# Patient Record
Sex: FEMALE | Race: OTHER | HISPANIC OR LATINO | URBAN - METROPOLITAN AREA
[De-identification: names, ages, dates, MRNs, and addresses within clinical notes are randomized per-mention and may not be internally consistent; named-entity substitution may affect disease eponyms.]

---

## 2020-11-17 ENCOUNTER — EMERGENCY (EMERGENCY)
Facility: HOSPITAL | Age: 75
LOS: 0 days | Discharge: HOME | End: 2020-11-17
Attending: EMERGENCY MEDICINE
Payer: MEDICARE

## 2020-11-17 VITALS
TEMPERATURE: 98 F | HEART RATE: 74 BPM | SYSTOLIC BLOOD PRESSURE: 125 MMHG | HEIGHT: 64 IN | WEIGHT: 139.99 LBS | RESPIRATION RATE: 16 BRPM | OXYGEN SATURATION: 99 % | DIASTOLIC BLOOD PRESSURE: 57 MMHG

## 2020-11-17 VITALS
TEMPERATURE: 98 F | DIASTOLIC BLOOD PRESSURE: 66 MMHG | SYSTOLIC BLOOD PRESSURE: 147 MMHG | RESPIRATION RATE: 17 BRPM | OXYGEN SATURATION: 99 % | HEART RATE: 66 BPM

## 2020-11-17 DIAGNOSIS — I25.10 ATHEROSCLEROTIC HEART DISEASE OF NATIVE CORONARY ARTERY WITHOUT ANGINA PECTORIS: ICD-10-CM

## 2020-11-17 DIAGNOSIS — R19.7 DIARRHEA, UNSPECIFIED: ICD-10-CM

## 2020-11-17 DIAGNOSIS — R07.9 CHEST PAIN, UNSPECIFIED: ICD-10-CM

## 2020-11-17 DIAGNOSIS — K57.92 DIVERTICULITIS OF INTESTINE, PART UNSPECIFIED, WITHOUT PERFORATION OR ABSCESS WITHOUT BLEEDING: ICD-10-CM

## 2020-11-17 DIAGNOSIS — E11.51 TYPE 2 DIABETES MELLITUS WITH DIABETIC PERIPHERAL ANGIOPATHY WITHOUT GANGRENE: ICD-10-CM

## 2020-11-17 DIAGNOSIS — R10.13 EPIGASTRIC PAIN: ICD-10-CM

## 2020-11-17 DIAGNOSIS — F32.9 MAJOR DEPRESSIVE DISORDER, SINGLE EPISODE, UNSPECIFIED: ICD-10-CM

## 2020-11-17 DIAGNOSIS — I10 ESSENTIAL (PRIMARY) HYPERTENSION: ICD-10-CM

## 2020-11-17 LAB
ALBUMIN SERPL ELPH-MCNC: 3.6 G/DL — SIGNIFICANT CHANGE UP (ref 3.3–5)
ALP SERPL-CCNC: 90 U/L — SIGNIFICANT CHANGE UP (ref 40–120)
ALT FLD-CCNC: 33 U/L — SIGNIFICANT CHANGE UP (ref 12–78)
ANION GAP SERPL CALC-SCNC: 8 MMOL/L — SIGNIFICANT CHANGE UP (ref 5–17)
APPEARANCE UR: CLEAR — SIGNIFICANT CHANGE UP
APTT BLD: 32.2 SEC — SIGNIFICANT CHANGE UP (ref 27.5–35.5)
AST SERPL-CCNC: 18 U/L — SIGNIFICANT CHANGE UP (ref 15–37)
BACTERIA # UR AUTO: ABNORMAL
BASOPHILS # BLD AUTO: 0.01 K/UL — SIGNIFICANT CHANGE UP (ref 0–0.2)
BASOPHILS NFR BLD AUTO: 0.2 % — SIGNIFICANT CHANGE UP (ref 0–2)
BILIRUB SERPL-MCNC: 0.5 MG/DL — SIGNIFICANT CHANGE UP (ref 0.2–1.2)
BILIRUB UR-MCNC: NEGATIVE — SIGNIFICANT CHANGE UP
BUN SERPL-MCNC: 6 MG/DL — LOW (ref 7–23)
CALCIUM SERPL-MCNC: 9.1 MG/DL — SIGNIFICANT CHANGE UP (ref 8.5–10.1)
CHLORIDE SERPL-SCNC: 102 MMOL/L — SIGNIFICANT CHANGE UP (ref 96–108)
CK MB CFR SERPL CALC: <1 NG/ML — SIGNIFICANT CHANGE UP (ref 0.5–3.6)
CO2 SERPL-SCNC: 24 MMOL/L — SIGNIFICANT CHANGE UP (ref 22–31)
COLOR SPEC: YELLOW — SIGNIFICANT CHANGE UP
CREAT SERPL-MCNC: 0.72 MG/DL — SIGNIFICANT CHANGE UP (ref 0.5–1.3)
DIFF PNL FLD: ABNORMAL
EOSINOPHIL # BLD AUTO: 0.01 K/UL — SIGNIFICANT CHANGE UP (ref 0–0.5)
EOSINOPHIL NFR BLD AUTO: 0.2 % — SIGNIFICANT CHANGE UP (ref 0–6)
GLUCOSE SERPL-MCNC: 105 MG/DL — HIGH (ref 70–99)
GLUCOSE UR QL: NEGATIVE MG/DL — SIGNIFICANT CHANGE UP
HCT VFR BLD CALC: 31.6 % — LOW (ref 34.5–45)
HGB BLD-MCNC: 10.9 G/DL — LOW (ref 11.5–15.5)
IMM GRANULOCYTES NFR BLD AUTO: 0.2 % — SIGNIFICANT CHANGE UP (ref 0–1.5)
INR BLD: 1.25 RATIO — HIGH (ref 0.88–1.16)
KETONES UR-MCNC: ABNORMAL
LEUKOCYTE ESTERASE UR-ACNC: ABNORMAL
LIDOCAIN IGE QN: 107 U/L — SIGNIFICANT CHANGE UP (ref 73–393)
LYMPHOCYTES # BLD AUTO: 1.53 K/UL — SIGNIFICANT CHANGE UP (ref 1–3.3)
LYMPHOCYTES # BLD AUTO: 25.1 % — SIGNIFICANT CHANGE UP (ref 13–44)
MAGNESIUM SERPL-MCNC: 1.4 MG/DL — LOW (ref 1.6–2.6)
MCHC RBC-ENTMCNC: 34.5 GM/DL — SIGNIFICANT CHANGE UP (ref 32–36)
MCHC RBC-ENTMCNC: 36 PG — HIGH (ref 27–34)
MCV RBC AUTO: 104.3 FL — HIGH (ref 80–100)
MONOCYTES # BLD AUTO: 0.38 K/UL — SIGNIFICANT CHANGE UP (ref 0–0.9)
MONOCYTES NFR BLD AUTO: 6.2 % — SIGNIFICANT CHANGE UP (ref 2–14)
NEUTROPHILS # BLD AUTO: 4.15 K/UL — SIGNIFICANT CHANGE UP (ref 1.8–7.4)
NEUTROPHILS NFR BLD AUTO: 68.1 % — SIGNIFICANT CHANGE UP (ref 43–77)
NITRITE UR-MCNC: NEGATIVE — SIGNIFICANT CHANGE UP
NRBC # BLD: 0 /100 WBCS — SIGNIFICANT CHANGE UP (ref 0–0)
NT-PROBNP SERPL-SCNC: 1555 PG/ML — HIGH (ref 0–125)
PH UR: 6 — SIGNIFICANT CHANGE UP (ref 5–8)
PLATELET # BLD AUTO: 306 K/UL — SIGNIFICANT CHANGE UP (ref 150–400)
POTASSIUM SERPL-MCNC: 3.5 MMOL/L — SIGNIFICANT CHANGE UP (ref 3.5–5.3)
POTASSIUM SERPL-SCNC: 3.5 MMOL/L — SIGNIFICANT CHANGE UP (ref 3.5–5.3)
PROT SERPL-MCNC: 7.3 GM/DL — SIGNIFICANT CHANGE UP (ref 6–8.3)
PROT UR-MCNC: 15 MG/DL
PROTHROM AB SERPL-ACNC: 14.4 SEC — HIGH (ref 10.6–13.6)
RBC # BLD: 3.03 M/UL — LOW (ref 3.8–5.2)
RBC # FLD: 12.8 % — SIGNIFICANT CHANGE UP (ref 10.3–14.5)
RBC CASTS # UR COMP ASSIST: SIGNIFICANT CHANGE UP /HPF (ref 0–4)
SODIUM SERPL-SCNC: 134 MMOL/L — LOW (ref 135–145)
SP GR SPEC: 1.01 — SIGNIFICANT CHANGE UP (ref 1.01–1.02)
TROPONIN I SERPL-MCNC: <.015 NG/ML — SIGNIFICANT CHANGE UP (ref 0.01–0.04)
UROBILINOGEN FLD QL: NEGATIVE MG/DL — SIGNIFICANT CHANGE UP
WBC # BLD: 6.09 K/UL — SIGNIFICANT CHANGE UP (ref 3.8–10.5)
WBC # FLD AUTO: 6.09 K/UL — SIGNIFICANT CHANGE UP (ref 3.8–10.5)
WBC UR QL: SIGNIFICANT CHANGE UP

## 2020-11-17 PROCEDURE — 99285 EMERGENCY DEPT VISIT HI MDM: CPT

## 2020-11-17 PROCEDURE — 74177 CT ABD & PELVIS W/CONTRAST: CPT | Mod: 26

## 2020-11-17 PROCEDURE — 93010 ELECTROCARDIOGRAM REPORT: CPT

## 2020-11-17 PROCEDURE — 71045 X-RAY EXAM CHEST 1 VIEW: CPT | Mod: 26

## 2020-11-17 RX ORDER — CIPROFLOXACIN LACTATE 400MG/40ML
500 VIAL (ML) INTRAVENOUS ONCE
Refills: 0 | Status: COMPLETED | OUTPATIENT
Start: 2020-11-17 | End: 2020-11-17

## 2020-11-17 RX ORDER — ACETAMINOPHEN 500 MG
975 TABLET ORAL ONCE
Refills: 0 | Status: COMPLETED | OUTPATIENT
Start: 2020-11-17 | End: 2020-11-17

## 2020-11-17 RX ORDER — CIPROFLOXACIN LACTATE 400MG/40ML
1 VIAL (ML) INTRAVENOUS
Qty: 18 | Refills: 0
Start: 2020-11-17 | End: 2020-11-25

## 2020-11-17 RX ORDER — MAGNESIUM OXIDE 400 MG ORAL TABLET 241.3 MG
400 TABLET ORAL ONCE
Refills: 0 | Status: COMPLETED | OUTPATIENT
Start: 2020-11-17 | End: 2020-11-17

## 2020-11-17 RX ORDER — PANTOPRAZOLE SODIUM 20 MG/1
40 TABLET, DELAYED RELEASE ORAL ONCE
Refills: 0 | Status: COMPLETED | OUTPATIENT
Start: 2020-11-17 | End: 2020-11-17

## 2020-11-17 RX ORDER — METRONIDAZOLE 500 MG
1 TABLET ORAL
Qty: 18 | Refills: 0
Start: 2020-11-17 | End: 2020-11-25

## 2020-11-17 RX ORDER — METRONIDAZOLE 500 MG
500 TABLET ORAL ONCE
Refills: 0 | Status: COMPLETED | OUTPATIENT
Start: 2020-11-17 | End: 2020-11-17

## 2020-11-17 RX ORDER — SODIUM CHLORIDE 9 MG/ML
1000 INJECTION, SOLUTION INTRAVENOUS ONCE
Refills: 0 | Status: COMPLETED | OUTPATIENT
Start: 2020-11-17 | End: 2020-11-17

## 2020-11-17 RX ORDER — SODIUM CHLORIDE 9 MG/ML
1000 INJECTION INTRAMUSCULAR; INTRAVENOUS; SUBCUTANEOUS ONCE
Refills: 0 | Status: COMPLETED | OUTPATIENT
Start: 2020-11-17 | End: 2020-11-17

## 2020-11-17 RX ADMIN — PANTOPRAZOLE SODIUM 40 MILLIGRAM(S): 20 TABLET, DELAYED RELEASE ORAL at 13:11

## 2020-11-17 RX ADMIN — MAGNESIUM OXIDE 400 MG ORAL TABLET 400 MILLIGRAM(S): 241.3 TABLET ORAL at 15:39

## 2020-11-17 RX ADMIN — SODIUM CHLORIDE 1000 MILLILITER(S): 9 INJECTION INTRAMUSCULAR; INTRAVENOUS; SUBCUTANEOUS at 13:12

## 2020-11-17 RX ADMIN — Medication 500 MILLIGRAM(S): at 15:40

## 2020-11-17 RX ADMIN — SODIUM CHLORIDE 1000 MILLILITER(S): 9 INJECTION, SOLUTION INTRAVENOUS at 13:11

## 2020-11-17 RX ADMIN — Medication 975 MILLIGRAM(S): at 15:40

## 2020-11-17 NOTE — ED PROVIDER NOTE - CARE PROVIDERS DIRECT ADDRESSES
[email or web address]

## 2020-11-17 NOTE — ED PROVIDER NOTE - PATIENT PORTAL LINK FT
You can access the FollowMyHealth Patient Portal offered by [facility] by registering at the following website: [email or web address]. By joining [employer]’s FollowMyHealth portal, you will also be able to view your health information using other applications (apps) compatible with our system.

## 2020-11-17 NOTE — ED PROVIDER NOTE - CLINICAL SUMMARY MEDICAL DECISION MAKING FREE TEXT BOX
epigastric pain. I read ekg as nsr rate 72, no st elevation or depression, qtc 427, narrow qrs, normal axis, inferior qs. epigastric pain. I read ekg as nsr rate 72, no st elevation or depression, qtc 427, narrow qrs, normal axis, inferior qs.  ct shows diverticulitis. will start abx. ok for dc home.

## 2020-11-17 NOTE — ED ADULT TRIAGE NOTE - CHIEF COMPLAINT QUOTE
chest pains chest pains since Saturday,  with abdominal pain, diarrhea and poor po intake since yesterday.

## 2020-11-17 NOTE — ED ADULT NURSE NOTE - CHPI ED NUR SYMPTOMS NEG
no chills/no back pain/no fever/no congestion/no syncope/no dizziness/no diaphoresis/no shortness of breath/no vomiting

## 2020-11-17 NOTE — ED ADULT NURSE NOTE - OBJECTIVE STATEMENT
76 y/o F AOx4 presents with c/o dull chest pain/epigastric pain rated at 7/10 consistent since Saturday. Pt lives with son Rainer who is at bedside. Pt states she fell from last bottom step on Friday and pain has been there since then. Pt has decreased intake, nausea and diarrhea since Friday as well. PMH of diabetes controlled with Metformin no sx hx.

## 2020-11-17 NOTE — ED PROVIDER NOTE - OBJECTIVE STATEMENT
75F hx of cad, pad, htn, dm, depression, pw cp. patient points more to her epigastrium. she notes this since friday 4 days ago. also notes loose stools (4 today) and weakness and poor appetite. no fever, chills, vomiting. feels nausea. ros neg for ha, vision loss, rhinorrhea, rash, bleeding, numbness, dysuria, back pain.

## 2020-12-13 ENCOUNTER — RESULT CHARGE (OUTPATIENT)
Age: 75
End: 2020-12-13

## 2021-01-14 ENCOUNTER — LABORATORY RESULT (OUTPATIENT)
Age: 76
End: 2021-01-14

## 2021-01-14 ENCOUNTER — NON-APPOINTMENT (OUTPATIENT)
Age: 76
End: 2021-01-14

## 2021-01-14 ENCOUNTER — APPOINTMENT (OUTPATIENT)
Dept: CARDIOLOGY | Facility: CLINIC | Age: 76
End: 2021-01-14
Payer: MEDICARE

## 2021-01-14 VITALS
TEMPERATURE: 98.1 F | DIASTOLIC BLOOD PRESSURE: 62 MMHG | SYSTOLIC BLOOD PRESSURE: 100 MMHG | BODY MASS INDEX: 23.22 KG/M2 | HEIGHT: 64 IN | HEART RATE: 87 BPM | WEIGHT: 136 LBS | OXYGEN SATURATION: 98 %

## 2021-01-14 PROCEDURE — 99203 OFFICE O/P NEW LOW 30 MIN: CPT

## 2021-01-14 PROCEDURE — 99072 ADDL SUPL MATRL&STAF TM PHE: CPT

## 2021-01-14 PROCEDURE — 93000 ELECTROCARDIOGRAM COMPLETE: CPT

## 2021-01-20 ENCOUNTER — APPOINTMENT (OUTPATIENT)
Dept: CARDIOLOGY | Facility: CLINIC | Age: 76
End: 2021-01-20
Payer: MEDICARE

## 2021-01-20 PROCEDURE — 99072 ADDL SUPL MATRL&STAF TM PHE: CPT

## 2021-01-20 PROCEDURE — 93306 TTE W/DOPPLER COMPLETE: CPT

## 2021-01-21 LAB
ALBUMIN SERPL ELPH-MCNC: 4.3 G/DL
ALP BLD-CCNC: 93 U/L
ALT SERPL-CCNC: 21 U/L
ANION GAP SERPL CALC-SCNC: 15 MMOL/L
AST SERPL-CCNC: 18 U/L
BASOPHILS # BLD AUTO: 0.03 K/UL
BASOPHILS NFR BLD AUTO: 0.3 %
BILIRUB SERPL-MCNC: 0.2 MG/DL
BUN SERPL-MCNC: 14 MG/DL
CALCIUM SERPL-MCNC: 9.7 MG/DL
CHLORIDE SERPL-SCNC: 101 MMOL/L
CHOLEST SERPL-MCNC: 287 MG/DL
CO2 SERPL-SCNC: 20 MMOL/L
CREAT SERPL-MCNC: 0.81 MG/DL
CRP SERPL HS-MCNC: 1.82 MG/L
EOSINOPHIL # BLD AUTO: 0.06 K/UL
EOSINOPHIL NFR BLD AUTO: 0.7 %
ESTIMATED AVERAGE GLUCOSE: 137 MG/DL
GLUCOSE SERPL-MCNC: 192 MG/DL
HBA1C MFR BLD HPLC: 6.4 %
HCT VFR BLD CALC: 34.1 %
HDLC SERPL-MCNC: 38 MG/DL
HGB BLD-MCNC: 11.3 G/DL
IMM GRANULOCYTES NFR BLD AUTO: 0.2 %
LDLC SERPL CALC-MCNC: NORMAL MG/DL
LYMPHOCYTES # BLD AUTO: 2.63 K/UL
LYMPHOCYTES NFR BLD AUTO: 28.6 %
MAGNESIUM SERPL-MCNC: 2 MG/DL
MAN DIFF?: NORMAL
MCHC RBC-ENTMCNC: 33.1 GM/DL
MCHC RBC-ENTMCNC: 35.5 PG
MCV RBC AUTO: 107.2 FL
MONOCYTES # BLD AUTO: 0.57 K/UL
MONOCYTES NFR BLD AUTO: 6.2 %
NEUTROPHILS # BLD AUTO: 5.89 K/UL
NEUTROPHILS NFR BLD AUTO: 64 %
NONHDLC SERPL-MCNC: 249 MG/DL
PLATELET # BLD AUTO: 354 K/UL
POTASSIUM SERPL-SCNC: 4.6 MMOL/L
PROT SERPL-MCNC: 6.8 G/DL
RBC # BLD: 3.18 M/UL
RBC # FLD: 12.5 %
SODIUM SERPL-SCNC: 136 MMOL/L
TRIGL SERPL-MCNC: 513 MG/DL
TSH SERPL-ACNC: 2.98 UIU/ML
WBC # FLD AUTO: 9.2 K/UL

## 2021-01-26 NOTE — PHYSICAL EXAM
[General Appearance - Well Developed] : well developed [Normal Appearance] : normal appearance [Well Groomed] : well groomed [General Appearance - Well Nourished] : well nourished [No Deformities] : no deformities [General Appearance - In No Acute Distress] : no acute distress [Normal Conjunctiva] : the conjunctiva exhibited no abnormalities [Eyelids - No Xanthelasma] : the eyelids demonstrated no xanthelasmas [Normal Oral Mucosa] : normal oral mucosa [No Oral Pallor] : no oral pallor [No Oral Cyanosis] : no oral cyanosis [Normal Jugular Venous A Waves Present] : normal jugular venous A waves present [Normal Jugular Venous V Waves Present] : normal jugular venous V waves present [No Jugular Venous Sheppard A Waves] : no jugular venous sheppard A waves [Heart Rate And Rhythm] : heart rate and rhythm were normal [Heart Sounds] : normal S1 and S2 [Murmurs] : no murmurs present [Respiration, Rhythm And Depth] : normal respiratory rhythm and effort [Exaggerated Use Of Accessory Muscles For Inspiration] : no accessory muscle use [Auscultation Breath Sounds / Voice Sounds] : lungs were clear to auscultation bilaterally [Abdomen Soft] : soft [Abdomen Tenderness] : non-tender [Abdomen Mass (___ Cm)] : no abdominal mass palpated [Abnormal Walk] : normal gait [Gait - Sufficient For Exercise Testing] : the gait was sufficient for exercise testing [Nail Clubbing] : no clubbing of the fingernails [Cyanosis, Localized] : no localized cyanosis [Petechial Hemorrhages (___cm)] : no petechial hemorrhages [Skin Color & Pigmentation] : normal skin color and pigmentation [] : no rash [No Venous Stasis] : no venous stasis [Skin Lesions] : no skin lesions [No Skin Ulcers] : no skin ulcer [No Xanthoma] : no  xanthoma was observed [Oriented To Time, Place, And Person] : oriented to person, place, and time [Affect] : the affect was normal [Mood] : the mood was normal [No Anxiety] : not feeling anxious

## 2021-02-05 NOTE — DISCUSSION/SUMMARY
[FreeTextEntry1] : Asymptomatic, BP's adequate. \par Repeat echo requested for risk assessment.\par Will research into copy of PCI report, plan length of treatment with Brilinta.\par \par Labs requested.\par Will see in several weeks for review of findings.

## 2021-02-25 ENCOUNTER — NON-APPOINTMENT (OUTPATIENT)
Age: 76
End: 2021-02-25

## 2021-02-25 ENCOUNTER — APPOINTMENT (OUTPATIENT)
Dept: CARDIOLOGY | Facility: CLINIC | Age: 76
End: 2021-02-25
Payer: MEDICARE

## 2021-02-25 VITALS
OXYGEN SATURATION: 97 % | DIASTOLIC BLOOD PRESSURE: 60 MMHG | WEIGHT: 140 LBS | SYSTOLIC BLOOD PRESSURE: 110 MMHG | BODY MASS INDEX: 23.9 KG/M2 | HEART RATE: 82 BPM | TEMPERATURE: 97.8 F | HEIGHT: 64 IN

## 2021-02-25 PROCEDURE — 99072 ADDL SUPL MATRL&STAF TM PHE: CPT

## 2021-02-25 PROCEDURE — 99214 OFFICE O/P EST MOD 30 MIN: CPT

## 2021-02-25 PROCEDURE — 93000 ELECTROCARDIOGRAM COMPLETE: CPT

## 2021-02-25 RX ORDER — OMEGA-3/DHA/EPA/FISH OIL 300-1000MG
1000 CAPSULE,DELAYED RELEASE (ENTERIC COATED) ORAL
Qty: 90 | Refills: 3 | Status: ACTIVE | COMMUNITY
Start: 2021-02-25 | End: 1900-01-01

## 2021-02-25 RX ORDER — EZETIMIBE 10 MG/1
10 TABLET ORAL
Qty: 90 | Refills: 3 | Status: ACTIVE | COMMUNITY
Start: 2021-02-25 | End: 1900-01-01

## 2021-02-25 NOTE — PHYSICAL EXAM
[General Appearance - Well Developed] : well developed [Normal Appearance] : normal appearance [Well Groomed] : well groomed [General Appearance - Well Nourished] : well nourished [No Deformities] : no deformities [General Appearance - In No Acute Distress] : no acute distress [Normal Conjunctiva] : the conjunctiva exhibited no abnormalities [Eyelids - No Xanthelasma] : the eyelids demonstrated no xanthelasmas [Normal Oral Mucosa] : normal oral mucosa [No Oral Pallor] : no oral pallor [No Oral Cyanosis] : no oral cyanosis [Normal Jugular Venous A Waves Present] : normal jugular venous A waves present [Normal Jugular Venous V Waves Present] : normal jugular venous V waves present [No Jugular Venous Sheppard A Waves] : no jugular venous sheppard A waves [Respiration, Rhythm And Depth] : normal respiratory rhythm and effort [Exaggerated Use Of Accessory Muscles For Inspiration] : no accessory muscle use [Auscultation Breath Sounds / Voice Sounds] : lungs were clear to auscultation bilaterally [Heart Rate And Rhythm] : heart rate and rhythm were normal [Heart Sounds] : normal S1 and S2 [Murmurs] : no murmurs present [Abdomen Soft] : soft [Abdomen Tenderness] : non-tender [Abdomen Mass (___ Cm)] : no abdominal mass palpated [Abnormal Walk] : normal gait [Gait - Sufficient For Exercise Testing] : the gait was sufficient for exercise testing [Nail Clubbing] : no clubbing of the fingernails [Cyanosis, Localized] : no localized cyanosis [Petechial Hemorrhages (___cm)] : no petechial hemorrhages [Skin Color & Pigmentation] : normal skin color and pigmentation [] : no rash [No Venous Stasis] : no venous stasis [Skin Lesions] : no skin lesions [No Skin Ulcers] : no skin ulcer [No Xanthoma] : no  xanthoma was observed [Oriented To Time, Place, And Person] : oriented to person, place, and time [Affect] : the affect was normal [Mood] : the mood was normal [No Anxiety] : not feeling anxious

## 2021-02-25 NOTE — HISTORY OF PRESENT ILLNESS
[FreeTextEntry1] : 75-year-old female with h/o DM, hypertension, and hyperlipidemia. h/o CAD, s/p PCI in [place], records unavailable.\par \par Seen in [facility] ED on 11/19/20 for atypical chest pain,and abdominal discomfort and change in bowel movements, diagnosed with diverticulitis and subsequently discharged on antibiotics. \par \par Here for followup of ASHD. c/o pain in antecubital veins bilaterally.

## 2021-02-25 NOTE — DISCUSSION/SUMMARY
[Patient] : the patient [___ Month(s)] : [unfilled] month(s) [FreeTextEntry1] : Asymptomatic, BP's adequate. \par Will continue to research into copy of PCI report, plan length of treatment with Brilinta.\par Requested venous UE ultrasound to r/o venous thromboses.\par Labs reviewed with patient. Persistently elevated cholesterol and TG's (non-fasting), added Zetia and recommended OTC omega 3 supplements. Recheck labs in 2-3 months.\par \par

## 2021-03-04 ENCOUNTER — APPOINTMENT (OUTPATIENT)
Dept: INTERNAL MEDICINE | Facility: CLINIC | Age: 76
End: 2021-03-04
Payer: MEDICARE

## 2021-03-04 ENCOUNTER — NON-APPOINTMENT (OUTPATIENT)
Age: 76
End: 2021-03-04

## 2021-03-04 VITALS
OXYGEN SATURATION: 95 % | RESPIRATION RATE: 16 BRPM | HEIGHT: 64 IN | WEIGHT: 140 LBS | DIASTOLIC BLOOD PRESSURE: 70 MMHG | SYSTOLIC BLOOD PRESSURE: 110 MMHG | BODY MASS INDEX: 23.9 KG/M2 | HEART RATE: 75 BPM

## 2021-03-04 DIAGNOSIS — Z83.3 FAMILY HISTORY OF DIABETES MELLITUS: ICD-10-CM

## 2021-03-04 DIAGNOSIS — Z12.39 ENCOUNTER FOR OTHER SCREENING FOR MALIGNANT NEOPLASM OF BREAST: ICD-10-CM

## 2021-03-04 PROCEDURE — 36415 COLL VENOUS BLD VENIPUNCTURE: CPT

## 2021-03-04 PROCEDURE — 99072 ADDL SUPL MATRL&STAF TM PHE: CPT

## 2021-03-04 PROCEDURE — 99213 OFFICE O/P EST LOW 20 MIN: CPT | Mod: 25

## 2021-03-04 PROCEDURE — G0439: CPT

## 2021-03-04 NOTE — HISTORY OF PRESENT ILLNESS
[FreeTextEntry1] : annual PE\par mult med issues. [de-identified] : 74 y/o female with a hx of dm, hld, htn, cad s/p rca thrombectomy and LANCE 4/20, diverticular dz, Vewrtigo, tremor, forgetfulness\par here for annual PE and her medical issues.\par with no c/o besides the tremors, vertigo, forgetfulness and tinnitus.\par No CV c/o.\par No GI c/o.\par No other neuro sx.\par No musculoskeletal c/o.\par living with family.\par No tob.\par Taking meds.\par Walks regularly, diet healthy\par Recently followed up with Dr Grove - labs reviewed.\par \par Vaccines - reported as up to date - to request records.  \par colon - ? 2 years ago.  did not f/u after the ER visit.\par mammo - due

## 2021-03-04 NOTE — PHYSICAL EXAM
[No Acute Distress] : no acute distress [Well Nourished] : well nourished [Well Developed] : well developed [Well-Appearing] : well-appearing [Normal Sclera/Conjunctiva] : normal sclera/conjunctiva [PERRL] : pupils equal round and reactive to light [EOMI] : extraocular movements intact [Normal Outer Ear/Nose] : the outer ears and nose were normal in appearance [Normal Oropharynx] : the oropharynx was normal [Normal TMs] : both tympanic membranes were normal [No JVD] : no jugular venous distention [No Lymphadenopathy] : no lymphadenopathy [Supple] : supple [Thyroid Normal, No Nodules] : the thyroid was normal and there were no nodules present [No Respiratory Distress] : no respiratory distress  [No Accessory Muscle Use] : no accessory muscle use [Clear to Auscultation] : lungs were clear to auscultation bilaterally [Normal Rate] : normal rate  [Regular Rhythm] : with a regular rhythm [Normal S1, S2] : normal S1 and S2 [No Murmur] : no murmur heard [No Carotid Bruits] : no carotid bruits [No Abdominal Bruit] : a ~M bruit was not heard ~T in the abdomen [Pedal Pulses Present] : the pedal pulses are present [No Edema] : there was no peripheral edema [No Palpable Aorta] : no palpable aorta [Soft] : abdomen soft [Non Tender] : non-tender [Non-distended] : non-distended [No Masses] : no abdominal mass palpated [No HSM] : no HSM [Normal Bowel Sounds] : normal bowel sounds [Normal Posterior Cervical Nodes] : no posterior cervical lymphadenopathy [Normal Anterior Cervical Nodes] : no anterior cervical lymphadenopathy [No CVA Tenderness] : no CVA  tenderness [No Spinal Tenderness] : no spinal tenderness [No Joint Swelling] : no joint swelling [Grossly Normal Strength/Tone] : grossly normal strength/tone [No Rash] : no rash [Coordination Grossly Intact] : coordination grossly intact [No Focal Deficits] : no focal deficits [Normal Gait] : normal gait [Speech Grossly Normal] : speech grossly normal [Normal Affect] : the affect was normal [Normal Mood] : the mood was normal [Normal Insight/Judgement] : insight and judgment were intact [de-identified] : calm, taking and interacting with her son.  Following commands [de-identified] : mild tremor at the hands [de-identified] : as abobe, interactive, following commands.

## 2021-03-04 NOTE — REVIEW OF SYSTEMS
[Headache] : headache [Memory Loss] : memory loss [Negative] : Heme/Lymph [FreeTextEntry4] : tinnitus [de-identified] : tremor

## 2021-03-04 NOTE — HEALTH RISK ASSESSMENT
[No] : In the past 12 months have you used drugs other than those required for medical reasons? No [No falls in past year] : Patient reported no falls in the past year [0] : 2) Feeling down, depressed, or hopeless: Not at all (0) [None] : None [With Family] : lives with family [Feels Safe at Home] : Feels safe at home [Fully functional (bathing, dressing, toileting, transferring, walking, feeding)] : Fully functional (bathing, dressing, toileting, transferring, walking, feeding) [Smoke Detector] : smoke detector [Carbon Monoxide Detector] : carbon monoxide detector [Seat Belt] :  uses seat belt [Sunscreen] : uses sunscreen [] : No [Audit-CScore] : 0 [[id number]] : 0 [EyeExamDate] : 2020 [Change in mental status noted] : No change in mental status noted [High Risk Behavior] : no high risk behavior [Reports changes in hearing] : Reports no changes in hearing [Reports changes in vision] : Reports no changes in vision [Reports changes in dental health] : Reports no changes in dental health [Guns at Home] : no guns at home [ColonoscopyDate] : ? 2019 [de-identified] : forgetful [de-identified] : Son has been helping her - lives with him.

## 2021-03-04 NOTE — COUNSELING
[None] : None [Good understanding] : Patient has a good understanding of lifestyle changes and steps needed to achieve self management goal Normal

## 2021-03-05 ENCOUNTER — LABORATORY RESULT (OUTPATIENT)
Age: 76
End: 2021-03-05

## 2021-03-07 LAB
FERRITIN SERPL-MCNC: 40 NG/ML
T PALLIDUM AB SER QL IA: NEGATIVE
TSH SERPL-ACNC: 2.14 UIU/ML
VIT B12 SERPL-MCNC: 287 PG/ML

## 2021-03-16 ENCOUNTER — APPOINTMENT (OUTPATIENT)
Dept: CARDIOLOGY | Facility: CLINIC | Age: 76
End: 2021-03-16

## 2021-03-16 DIAGNOSIS — M79.603 PAIN IN ARM, UNSPECIFIED: ICD-10-CM

## 2021-04-02 ENCOUNTER — NON-APPOINTMENT (OUTPATIENT)
Age: 76
End: 2021-04-02

## 2021-04-05 ENCOUNTER — APPOINTMENT (OUTPATIENT)
Dept: INTERNAL MEDICINE | Facility: CLINIC | Age: 76
End: 2021-04-05
Payer: MEDICARE

## 2021-04-05 ENCOUNTER — RX RENEWAL (OUTPATIENT)
Age: 76
End: 2021-04-05

## 2021-04-05 VITALS
DIASTOLIC BLOOD PRESSURE: 70 MMHG | WEIGHT: 139 LBS | HEART RATE: 100 BPM | BODY MASS INDEX: 23.73 KG/M2 | OXYGEN SATURATION: 97 % | SYSTOLIC BLOOD PRESSURE: 110 MMHG | RESPIRATION RATE: 16 BRPM | HEIGHT: 64 IN

## 2021-04-05 PROCEDURE — 99214 OFFICE O/P EST MOD 30 MIN: CPT

## 2021-04-05 NOTE — PHYSICAL EXAM
[No Acute Distress] : no acute distress [Well Nourished] : well nourished [Well Developed] : well developed [Well-Appearing] : well-appearing [Normal Sclera/Conjunctiva] : normal sclera/conjunctiva [PERRL] : pupils equal round and reactive to light [EOMI] : extraocular movements intact [Normal Outer Ear/Nose] : the outer ears and nose were normal in appearance [Normal Oropharynx] : the oropharynx was normal [Normal TMs] : both tympanic membranes were normal [No JVD] : no jugular venous distention [No Lymphadenopathy] : no lymphadenopathy [Supple] : supple [Thyroid Normal, No Nodules] : the thyroid was normal and there were no nodules present [No Respiratory Distress] : no respiratory distress  [No Accessory Muscle Use] : no accessory muscle use [Clear to Auscultation] : lungs were clear to auscultation bilaterally [Normal Rate] : normal rate  [Regular Rhythm] : with a regular rhythm [Normal S1, S2] : normal S1 and S2 [No Murmur] : no murmur heard [No Carotid Bruits] : no carotid bruits [No Abdominal Bruit] : a ~M bruit was not heard ~T in the abdomen [Pedal Pulses Present] : the pedal pulses are present [No Edema] : there was no peripheral edema [No Palpable Aorta] : no palpable aorta [Soft] : abdomen soft [Non Tender] : non-tender [Non-distended] : non-distended [No Masses] : no abdominal mass palpated [No HSM] : no HSM [Normal Bowel Sounds] : normal bowel sounds [Normal Posterior Cervical Nodes] : no posterior cervical lymphadenopathy [Normal Anterior Cervical Nodes] : no anterior cervical lymphadenopathy [No CVA Tenderness] : no CVA  tenderness [No Spinal Tenderness] : no spinal tenderness [Grossly Normal Strength/Tone] : grossly normal strength/tone [No Joint Swelling] : no joint swelling [No Rash] : no rash [Coordination Grossly Intact] : coordination grossly intact [No Focal Deficits] : no focal deficits [Normal Gait] : normal gait [Speech Grossly Normal] : speech grossly normal [Normal Affect] : the affect was normal [Normal Mood] : the mood was normal [Normal Insight/Judgement] : insight and judgment were intact [de-identified] : calm, taking and interacting with her son.  Following commands [de-identified] : mild tremor at the hands [de-identified] : as abobe, interactive, following commands.

## 2021-04-05 NOTE — HEALTH RISK ASSESSMENT
[No] : In the past 12 months have you used drugs other than those required for medical reasons? No [(PHQ-2) Unable to screen] : PHQ-2: unable to screen [] : No [Audit-CScore] : 0 [UnableToScreenReason] : ? sx of the confusion and forgetfulness.

## 2021-04-05 NOTE — ASSESSMENT
[FreeTextEntry1] : Asking re: eval for pt's competency - referred to neurology as noted - also gave info for psych.  \par d/w them at length.\par Will also refer to care management.

## 2021-04-05 NOTE — HISTORY OF PRESENT ILLNESS
[Other: _____] : [unfilled] [FreeTextEntry1] : forgetfulness\par mult med issues [de-identified] : Seen with son translating.\par 74 y/o female with a hx of dm, hld, htn, cad s/p rca thrombectomy and LANCE 4/20, diverticular dz, Vertigo, tremor, forgetfulness\par here for her medical issues.\par Reports that pt's daughter in law passed away from covid while getting treated for colon cancer.  Reports that the pt's ms has deteriorated with more periods of forgetfulness and confusion.  Also reporting inc hearing loss.  \par with no other c/o besides the tremors, vertigo, and tinnitus.\par Has not made appt for MRI or with neurology.  \par Has not made appt with ENT.  \par Taking meds\par Labs from last visit reviewed.  \par No CV c/o.\par No GI c/o.\par No other neuro sx.\par No musculoskeletal c/o.\par living with family.\par No tob.\par Taking meds.\par Walks regularly, diet healthy\par \par Vaccines - reported as up to date - to request records. \par colon - ? 2 years ago. did not f/u after the ER visit.\par mammo - due

## 2021-04-05 NOTE — REVIEW OF SYSTEMS
[Headache] : headache [Memory Loss] : memory loss [Negative] : Heme/Lymph [Hearing Loss] : hearing loss [Dizziness] : dizziness [Confusion] : confusion [Earache] : no earache [Nosebleed] : no nosebleeds [Hoarseness] : no hoarseness [Nasal Discharge] : no nasal discharge [Sore Throat] : no sore throat [Postnasal Drip] : no postnasal drip [FreeTextEntry4] : tinnitus [de-identified] : tremor

## 2021-04-05 NOTE — COUNSELING
[Behavioral health counseling provided] : Behavioral health counseling provided [Engage in a relaxing activity] : Engage in a relaxing activity [Plan in advance] : Plan in advance [Needs reinforcement, provided] : Patient needs reinforcement on understanding of lifestyle changes and steps needed to achieve self management goal; reinforcement was provided

## 2021-04-13 ENCOUNTER — NON-APPOINTMENT (OUTPATIENT)
Age: 76
End: 2021-04-13

## 2021-04-28 ENCOUNTER — APPOINTMENT (OUTPATIENT)
Dept: CARDIOLOGY | Facility: CLINIC | Age: 76
End: 2021-04-28
Payer: MEDICARE

## 2021-04-28 ENCOUNTER — NON-APPOINTMENT (OUTPATIENT)
Age: 76
End: 2021-04-28

## 2021-04-28 VITALS
OXYGEN SATURATION: 98 % | DIASTOLIC BLOOD PRESSURE: 60 MMHG | HEART RATE: 74 BPM | TEMPERATURE: 98.4 F | WEIGHT: 144 LBS | BODY MASS INDEX: 24.59 KG/M2 | SYSTOLIC BLOOD PRESSURE: 110 MMHG | HEIGHT: 64 IN

## 2021-04-28 DIAGNOSIS — R07.9 CHEST PAIN, UNSPECIFIED: ICD-10-CM

## 2021-04-28 DIAGNOSIS — R94.31 ABNORMAL ELECTROCARDIOGRAM [ECG] [EKG]: ICD-10-CM

## 2021-04-28 PROCEDURE — 93000 ELECTROCARDIOGRAM COMPLETE: CPT

## 2021-04-28 PROCEDURE — 99072 ADDL SUPL MATRL&STAF TM PHE: CPT

## 2021-04-28 PROCEDURE — 99214 OFFICE O/P EST MOD 30 MIN: CPT

## 2021-04-28 RX ORDER — TICAGRELOR 90 MG/1
90 TABLET ORAL TWICE DAILY
Qty: 90 | Refills: 3 | Status: DISCONTINUED | COMMUNITY
End: 2021-04-28

## 2021-04-28 NOTE — HISTORY OF PRESENT ILLNESS
[FreeTextEntry1] : 75-year-old female with h/o DM, hypertension, and hyperlipidemia. h/o CAD, s/p PCI in [facility] post inferior wall STEMI, records reviewed after last visit\par \par Seen in [facility] ED on 11/19/20 for atypical chest pain,and abdominal discomfort and change in bowel movements, diagnosed with diverticulitis and subsequently discharged on antibiotics. \par \par Here for followup of ASHD. Actively walking but has occasional left sided chest wall pain on mental stress.No dyspnea, diaphoresis, nausea.

## 2021-04-28 NOTE — CARDIOLOGY SUMMARY
[de-identified] : 8/20/14, Normal Lexiscan study [de-identified] : 4/28/21, Sinus Rhythm \par -Inferior infarct -age undetermined. [de-identified] : 1/20/21, EF=60-65%, grade 1 diastolic dysfunction, trace AI. [de-identified] : 4/4/20, LANCE to RCA with aspiration thrombectomy of RCA.

## 2021-04-28 NOTE — DISCUSSION/SUMMARY
[Patient] : the patient [___ Month(s)] : in [unfilled] month(s) [FreeTextEntry1] : Chest discomfort, s/p PTCA in 4/20, doubt ischemic heart disease. Will re-evaluate with repeat Lexiscan study. Can likely discontinue Brilinta as it has been more than a year and a non-critical lesion. Patient told that she needs to be on ASA for life.\par Repeat lipid profile with PCP.\par

## 2021-05-06 ENCOUNTER — APPOINTMENT (OUTPATIENT)
Dept: INTERNAL MEDICINE | Facility: CLINIC | Age: 76
End: 2021-05-06

## 2021-05-17 ENCOUNTER — APPOINTMENT (OUTPATIENT)
Dept: CARDIOLOGY | Facility: CLINIC | Age: 76
End: 2021-05-17
Payer: MEDICARE

## 2021-05-17 ENCOUNTER — MED ADMIN CHARGE (OUTPATIENT)
Age: 76
End: 2021-05-17

## 2021-05-17 PROCEDURE — 93015 CV STRESS TEST SUPVJ I&R: CPT

## 2021-05-17 PROCEDURE — A9500: CPT

## 2021-05-17 PROCEDURE — 78452 HT MUSCLE IMAGE SPECT MULT: CPT

## 2021-05-17 RX ORDER — REGADENOSON 0.08 MG/ML
0.4 INJECTION, SOLUTION INTRAVENOUS
Qty: 1 | Refills: 0 | Status: COMPLETED | OUTPATIENT
Start: 2021-05-17

## 2021-05-17 RX ADMIN — REGADENOSON 4 MG/5ML: 0.08 INJECTION, SOLUTION INTRAVENOUS at 00:00

## 2021-05-21 ENCOUNTER — APPOINTMENT (OUTPATIENT)
Dept: NEUROLOGY | Facility: CLINIC | Age: 76
End: 2021-05-21

## 2021-05-24 ENCOUNTER — APPOINTMENT (OUTPATIENT)
Dept: NEUROLOGY | Facility: CLINIC | Age: 76
End: 2021-05-24
Payer: MEDICARE

## 2021-05-24 VITALS
HEIGHT: 64 IN | HEART RATE: 77 BPM | BODY MASS INDEX: 24.59 KG/M2 | SYSTOLIC BLOOD PRESSURE: 115 MMHG | WEIGHT: 144 LBS | DIASTOLIC BLOOD PRESSURE: 72 MMHG

## 2021-05-24 PROCEDURE — 99205 OFFICE O/P NEW HI 60 MIN: CPT

## 2021-05-24 RX ORDER — FLUOXETINE 20 MG/1
20 TABLET ORAL DAILY
Refills: 0 | Status: DISCONTINUED | COMMUNITY
End: 2021-05-24

## 2021-05-24 NOTE — REASON FOR VISIT
[Initial Evaluation] : an initial evaluation [Family Member] : family member [FreeTextEntry1] : Forgetfulness

## 2021-05-24 NOTE — HISTORY OF PRESENT ILLNESS
[FreeTextEntry1] : COVID VACCINE COMPLETE.\par \par HPI: 76yo RH HW, with anxiety and depression, DM, HLD, HTN, here for concerns of cognitive decline. \par Macrocytic anemia with : \par \par PMH:\par over the last few months, the family and her friends grew concerned about her cognitive ability. She started to be very forgetful, and would confuse her location btw [place] and [facility].\par This has been a fluctuating process, but things are getting worse more recently.\par \par CAD with MI in 4/2020, s/p PCI. Per Dr. Grove: chest discomfort, s/p PTCA in 4/20, doubt ischemic heart disease. recently dc Brilinta, continue ASA81.\par \par While in [facility], she was living with other patients with anxiety and depression, e.g. group home, helping each others.\par \par -Memory: STM, recent events, locations, dates, appointments\par -Speech: ok\par -Orientation: at times disoriented in time and place\par -Praxis: ok\par -Decision making/Executive fx/Multitasking: reduced ability\par \par -Sleep: poor, interrupted at night, no naps\par \par -Appetite: ok, no change, weight maintained\par \par -Motor symptoms: fall with LLE fx in 2019; gait limited by pain and arthritis, uses walker for safety; some hand tremor, exacerbated by anxiety\par \par -B/B: mild urinary leaks; constipation\par \par -Psychiatric symptoms: chronic anxiety and depression, followed by psych in [facility]\par \par -Functional status:\par ADL: mostly ok, but needs supervision and prompting\par IADL: limited, can use phone, limited use of appliances\par CDR: 1.0-1.5\par \par -Professional status: retired, \par \par PCP and other physicians:\par -PCP: Swapna\par -Cardio: [place]\par -Psychiatrist: ? [facility].\par \par Workup done: None. \par Of note: normal B12, RPR, TSH, CRP.

## 2021-05-24 NOTE — ASSESSMENT
[FreeTextEntry1] : Assessment:\par 74yo RH HW with CAD/CV risk factors, ongoing anxiety and depression, here with concerns of forgetfulness by family. 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 \par \par Diagnostic Impression:\par -MCI-vascular\par -insomnia\par -anxiety\par \par Plan:\par Rule out reversible and vascular causes:\par -B vitamins, TFT, RPR\par -MRI brain w/o kristal\par -basic inflammatory labs\par -Lyme serology\par -melatonin 1mg+1mg\par -will try to refer to [place]-speaking psychiatrist\par -PT.\par I recommended to pursue mental and physical activity and to adhere to Mediterranean type of diet.\par \par \par A thorough discussion was entertained with the patient/caregiver regarding the use of psychoactive medications, their possible benefits and AE profile, including the risk of cardiovascular complications, including but not limited to applicable black box warning and teratogenicity, where appropriate.\par We discussed the benefits of being active, physically and mentally, and the need to to establish a routine in this respect.\par Driving abilities and firearms possession and use were discussed, in relation to progression of the cognitive decline, and the need to assess them periodically.\par Patient/caregiver advised to bring previous records to this office.\par All questions were answered at the time of the visit. We are certainly available for further discussion as needed.\par Patient/caregiver fully understands and agree with the plan.\par

## 2021-05-24 NOTE — PHYSICAL EXAM
[General Appearance - Alert] : alert [General Appearance - In No Acute Distress] : in no acute distress [Oriented To Time, Place, And Person] : oriented to person, place, and time [Impaired Insight] : insight and judgment were intact [Affect] : the affect was normal [Person] : oriented to person [Place] : oriented to place [Time] : oriented to time [Concentration Intact] : normal concentrating ability [Visual Intact] : visual attention was ~T not ~L decreased [Naming Objects] : no difficulty naming common objects [Repeating Phrases] : no difficulty repeating a phrase [Writing A Sentence] : no difficulty writing a sentence [Fluency] : fluency intact [Comprehension] : comprehension intact [Reading] : reading intact [Current Events] : adequate knowledge of current events [Past History] : adequate knowledge of personal past history [Total Score ___ / 30] : the patient achieved a score of [unfilled] /30 [Date / Time ___ / 5] : date / time [unfilled] / 5 [Place ___ / 5] : place [unfilled] / 5 [Registration ___ / 3] : registration [unfilled] / 3 [Serial Sevens ___/5] : serial sevens [unfilled] / 5 [Naming 2 Objects ___ / 2] : naming two objects [unfilled] / 2 [Repeating a Sentence ___ / 1] : repeating a sentence [unfilled] / 1 [Writing a Sentence ___ / 1] : write sentence [unfilled] / 1 [3-stage Verbal Command ___ / 3] : three-stage verbal command [unfilled] / 3 [Written Command ___ / 1] : written command [unfilled] / 1 [Copy a Design ___ / 1] : copy a design [unfilled] / 1 [Recall ___ / 3] : recall [unfilled] / 3 [Cranial Nerves Optic (II)] : visual acuity intact bilaterally,  visual fields full to confrontation, pupils equal round and reactive to light [Cranial Nerves Oculomotor (III)] : extraocular motion intact [Cranial Nerves Trigeminal (V)] : facial sensation intact symmetrically [Cranial Nerves Facial (VII)] : face symmetrical [Cranial Nerves Vestibulocochlear (VIII)] : hearing was intact bilaterally [Cranial Nerves Glossopharyngeal (IX)] : tongue and palate midline [Cranial Nerves Accessory (XI - Cranial And Spinal)] : head turning and shoulder shrug symmetric [Cranial Nerves Hypoglossal (XII)] : there was no tongue deviation with protrusion [Motor Strength] : muscle strength was normal in all four extremities [Involuntary Movements] : no involuntary movements were seen [No Muscle Atrophy] : normal bulk in all four extremities [Motor Handedness Right-Handed] : the patient is right hand dominant [Sensation Tactile Decrease] : light touch was intact [Sensation Pain / Temperature Decrease] : pain and temperature was intact [Sensation Vibration Decrease] : vibration was intact [Proprioception] : proprioception was intact [Balance] : balance was intact [Tremor] : a tremor present [2+] : Ankle jerk left 2+ [Sclera] : the sclera and conjunctiva were normal [PERRL With Normal Accommodation] : pupils were equal in size, round, reactive to light, with normal accommodation [Extraocular Movements] : extraocular movements were intact [Optic Disc Abnormality] : the optic disc were normal in size and color [No APD] : no afferent pupillary defect [No MARCELLO] : no internuclear ophthalmoplegia [Full Visual Field] : full visual field [Outer Ear] : the ears and nose were normal in appearance [Oropharynx] : the oropharynx was normal [Neck Appearance] : the appearance of the neck was normal [Neck Cervical Mass (___cm)] : no neck mass was observed [Jugular Venous Distention Increased] : there was no jugular-venous distention [Thyroid Diffuse Enlargement] : the thyroid was not enlarged [Thyroid Nodule] : there were no palpable thyroid nodules [Auscultation Breath Sounds / Voice Sounds] : lungs were clear to auscultation bilaterally [Heart Rate And Rhythm] : heart rate was normal and rhythm regular [Heart Sounds] : normal S1 and S2 [Heart Sounds Gallop] : no gallops [Murmurs] : no murmurs [Heart Sounds Pericardial Friction Rub] : no pericardial rub [Arterial Pulses Carotid] : carotid pulses were normal with no bruits [Full Pulse] : the pedal pulses are present [Edema] : there was no peripheral edema [Bowel Sounds] : normal bowel sounds [Abdomen Soft] : soft [Abdomen Tenderness] : non-tender [Abdomen Mass (___ Cm)] : no abdominal mass palpated [No CVA Tenderness] : no ~M costovertebral angle tenderness [No Spinal Tenderness] : no spinal tenderness [Abnormal Walk] : normal gait [Nail Clubbing] : no clubbing  or cyanosis of the fingernails [Musculoskeletal - Swelling] : no joint swelling seen [Motor Tone] : muscle strength and tone were normal [Skin Color & Pigmentation] : normal skin color and pigmentation [Skin Turgor] : normal skin turgor [] : no rash [Motor Strength Upper Extremities Bilaterally] : strength was normal in both upper extremities [Motor Strength Lower Extremities Bilaterally] : strength was normal in both lower extremities [Romberg's Sign] : Romberg's sign was negtive [Allodynia] : no ~T allodynia present [Dysesthesia] : no dysesthesia [Hyperesthesia] : no hyperesthesia [Past-pointing] : there was no past-pointing [Plantar Reflex Right Only] : normal on the right [Plantar Reflex Left Only] : normal on the left [FreeTextEntry4] : Mental Status Exam\par Presidents: 3/5\par Alternating Pattern: ok\par Spiral: ok\par Clock: 3/3\par Repetition: ok\par \par R/L discrimination on self and examiner: ok\par Cross-line commands: ok\par Praxis:\par -Motor: ok\par -Dynamic/Luria: ok-forgets pattern\par -Ideomotor/Imitation: ok\par -Ideational/writing/closing-in: ok\par -Dressing: ok. [FreeTextEntry8] : L hand tremor, postural, fine. Cautioned gait, due to pain in L knee; no parkinsonism; march is ok, no shuffle. pivots in 3 steps.

## 2021-05-25 ENCOUNTER — APPOINTMENT (OUTPATIENT)
Dept: MRI IMAGING | Facility: CLINIC | Age: 76
End: 2021-05-25

## 2021-05-27 ENCOUNTER — NON-APPOINTMENT (OUTPATIENT)
Age: 76
End: 2021-05-27

## 2021-06-17 ENCOUNTER — APPOINTMENT (OUTPATIENT)
Dept: OTOLARYNGOLOGY | Facility: CLINIC | Age: 76
End: 2021-06-17

## 2021-06-18 ENCOUNTER — APPOINTMENT (OUTPATIENT)
Dept: MRI IMAGING | Facility: CLINIC | Age: 76
End: 2021-06-18

## 2021-06-29 ENCOUNTER — RX RENEWAL (OUTPATIENT)
Age: 76
End: 2021-06-29

## 2021-06-29 RX ORDER — METFORMIN HYDROCHLORIDE 1000 MG/1
1000 TABLET, COATED ORAL TWICE DAILY
Qty: 180 | Refills: 0 | Status: ACTIVE | COMMUNITY
Start: 2021-04-05 | End: 1900-01-01

## 2021-06-30 ENCOUNTER — RX RENEWAL (OUTPATIENT)
Age: 76
End: 2021-06-30

## 2021-06-30 RX ORDER — PANTOPRAZOLE 40 MG/1
40 TABLET, DELAYED RELEASE ORAL DAILY
Qty: 90 | Refills: 2 | Status: ACTIVE | COMMUNITY
Start: 2021-06-30 | End: 1900-01-01

## 2021-09-27 ENCOUNTER — RX RENEWAL (OUTPATIENT)
Age: 76
End: 2021-09-27

## 2021-10-27 ENCOUNTER — APPOINTMENT (OUTPATIENT)
Dept: CARDIOLOGY | Facility: CLINIC | Age: 76
End: 2021-10-27
Payer: MEDICARE

## 2021-10-27 ENCOUNTER — NON-APPOINTMENT (OUTPATIENT)
Age: 76
End: 2021-10-27

## 2021-10-27 VITALS
DIASTOLIC BLOOD PRESSURE: 50 MMHG | HEIGHT: 64 IN | SYSTOLIC BLOOD PRESSURE: 110 MMHG | OXYGEN SATURATION: 97 % | HEART RATE: 70 BPM | BODY MASS INDEX: 24.07 KG/M2 | WEIGHT: 141 LBS

## 2021-10-27 PROCEDURE — 93000 ELECTROCARDIOGRAM COMPLETE: CPT

## 2021-10-27 PROCEDURE — 99213 OFFICE O/P EST LOW 20 MIN: CPT

## 2021-10-27 NOTE — HISTORY OF PRESENT ILLNESS
[FreeTextEntry1] : 75-year-old female with h/o DM, hypertension, and hyperlipidemia. h/o CAD, s/p PCI in [place], records unavailable.\par \par Seen in [facility] ED on 11/19/20 for atypical chest pain,and abdominal discomfort and change in bowel movements, diagnosed with diverticulitis and subsequently discharged on antibiotics. \par \par Here for followup of ASHD. c/o pain in left shoulder and chest unrelated tgo effort.\par Not walking too much.

## 2021-10-27 NOTE — DISCUSSION/SUMMARY
[Patient] : the patient [___ Month(s)] : in [unfilled] month(s) [FreeTextEntry1] : Asymptomatic, BP's adequate. \par On ASA, continue statins.\par Labs reviewed with patient. Persistently elevated cholesterol and TG's (non-fasting), added Zetia and recommended OTC omega 3 supplements. Recheck lipids.\par \par

## 2021-10-27 NOTE — END OF VISIT
This nurse spoke to Foster in infusion unit that we are having problems with the mediport flushing and infusing even after trying a new needle; pt advised by this nurse to call the physician that placed it to let them know; pt coming here to the infusion unit on Tuesday for iron infusion; pt verbalized understanding; Yanira Yanes  07/16/21 1020 [Time Spent: ___ minutes] : I have spent [unfilled] minutes of time on the encounter.

## 2021-10-27 NOTE — CARDIOLOGY SUMMARY
[de-identified] : 10/27/21, Sinus  Rhythm \par -Inferior infarct -age undetermined  -Prominent R(V1) -true posterior extension of inferior MI. \par  [de-identified] : 5/17/21, Fixed basal inferolateral and basal inferior scar. EF=58%. [de-identified] : 1/20/21, 60-65%, grade 1 diastolic dysfnxn, trace AI [de-identified] : 4/4/20, LANCE to RCA, 70% LM, prox to mid LAD severe disease, 90% focal LCx. Porcelain aorta. [___] : [unfilled] [LVEF ___%] : LVEF [unfilled]%

## 2021-11-29 ENCOUNTER — APPOINTMENT (OUTPATIENT)
Dept: NEUROLOGY | Facility: CLINIC | Age: 76
End: 2021-11-29

## 2022-01-10 ENCOUNTER — APPOINTMENT (OUTPATIENT)
Dept: NEUROLOGY | Facility: CLINIC | Age: 77
End: 2022-01-10
Payer: MEDICARE

## 2022-01-10 DIAGNOSIS — R26.89 OTHER ABNORMALITIES OF GAIT AND MOBILITY: ICD-10-CM

## 2022-01-10 PROCEDURE — 99214 OFFICE O/P EST MOD 30 MIN: CPT | Mod: 95

## 2022-01-10 RX ORDER — FLUOXETINE HYDROCHLORIDE 60 MG/1
60 TABLET ORAL DAILY
Qty: 30 | Refills: 2 | Status: DISCONTINUED | COMMUNITY
Start: 2021-05-24 | End: 2022-01-10

## 2022-01-10 RX ORDER — ALENDRONATE SODIUM 70 MG/1
70 TABLET ORAL
Qty: 12 | Refills: 0 | Status: ACTIVE | COMMUNITY
Start: 2021-07-08

## 2022-01-10 RX ORDER — FLUOXETINE HYDROCHLORIDE 20 MG/1
20 CAPSULE ORAL DAILY
Qty: 30 | Refills: 3 | Status: ACTIVE | COMMUNITY
Start: 2021-10-25 | End: 1900-01-01

## 2022-01-10 NOTE — ASSESSMENT
[FreeTextEntry1] : Assessment:\par 77yo RH HW with CAD/CV risk factors, ongoing anxiety and depression, here with concerns of forgetfulness by family. 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 \par \par Pt more depressed and anxious now.\par Will have to complete labs and MRI.\par \par Diagnostic Impression:\par -MCI-vascular\par -insomnia\par -anxiety\par \par Plan:\par Rule out reversible and vascular causes:\par -B vitamins, TFT, RPR\par -MRI brain w/o kristal\par -basic inflammatory labs\par -Lyme serology\par -melatonin 1mg+1mg\par -PT.\par -resume Buspirone 10mg daily and increase as needed\par -will d/w Dr. Berrios why Fluoxetine was reduced. \par I recommended to pursue mental and physical activity and to adhere to Mediterranean type of diet.\par \par \par A thorough discussion was entertained with the patient/caregiver regarding the use of psychoactive medications, their possible benefits and AE profile, including the risk of cardiovascular complications, including but not limited to applicable black box warning and teratogenicity, where appropriate.\par We discussed the benefits of being active, physically and mentally, and the need to to establish a routine in this respect.\par Driving abilities and firearms possession and use were discussed, in relation to progression of the cognitive decline, and the need to assess them periodically.\par Patient/caregiver advised to bring previous records to this office.\par All questions were answered at the time of the visit. We are certainly available for further discussion as needed.\par Patient/caregiver fully understands and agree with the plan.\par

## 2022-01-10 NOTE — HISTORY OF PRESENT ILLNESS
[Home] : at home, [unfilled] , at the time of the visit. [Medical Office: ([facility])___] : at the medical office located in  [Family Member] : family member [FreeTextEntry1] : COVID VACCINE COMPLETE.\par \par HPI-Interval hx [id number]:\par Pt has been feeling more depressed and anxious lately, in part due to losing her DIL and having had sad holidays without her, plus she has ran out of Buspar 2 weeks ago and now PCP reduced Fluoxetine to 20mg. \par She is thinking about her friends and her country.\par Unclear why Fluoxetine has been decreased (Dr. Berrios).\par Appetite ok.\par Sleep is limited by initial insomnia.\par She did not get labs and MRI done.\par \par \par HPI: 76yo RH HW, with anxiety and depression, DM, HLD, HTN, here for concerns of cognitive decline. \par Macrocytic anemia with : \par \par PMH:\par over the last few months, the family and her friends grew concerned about her cognitive ability. She started to be very forgetful, and would confuse her location w [place] and [facility].\par This has been a fluctuating process, but things are getting worse more recently.\par \par CAD with MI in 4/2020, s/p PCI. Per Dr. Grove: chest discomfort, s/p PTCA in 4/20, doubt ischemic heart disease. recently dc [place], continue ASA81.\par \par While in [facility], she was living with other patients with anxiety and depression, e.g. group home, helping each others.\par \par -Memory: STM, recent events, locations, dates, appointments\par -Speech: ok\par -Orientation: at times disoriented in time and place\par -Praxis: ok\par -Decision making/Executive fx/Multitasking: reduced ability\par \par -Sleep: poor, interrupted at night, no naps\par \par -Appetite: ok, no change, weight maintained\par \par -Motor symptoms: fall with LLE fx in 2019; gait limited by pain and arthritis, uses walker for safety; some hand tremor, exacerbated by anxiety\par \par -B/B: mild urinary leaks; constipation\par \par -Psychiatric symptoms: chronic anxiety and depression, followed by psych in [facility]\par \par -Functional status:\par ADL: mostly ok, but needs supervision and prompting\par IADL: limited, can use phone, limited use of appliances\par CDR: 1.0-1.5\par \par -Professional status: retired, \par \par PCP and other physicians:\par -PCP: Teodoro Berrios[facility]\par Dr. Jaci Berrios, DO\par Doctor in [place], [place]\par Address: [address] [place] [address]\par Phone: [phone]\par -Cardio: Fefer\par -Psychiatrist: ? FLA.\par \par Workup done: None. \par Of note: normal B12, RPR, TSH, CRP.

## 2022-01-10 NOTE — PHYSICAL EXAM
[General Appearance - Alert] : alert [General Appearance - In No Acute Distress] : in no acute distress [Oriented To Time, Place, And Person] : oriented to person, place, and time [Impaired Insight] : insight and judgment were intact [Affect] : the affect was normal [Person] : oriented to person [Place] : oriented to place [Time] : oriented to time [Concentration Intact] : normal concentrating ability [Visual Intact] : visual attention was ~T not ~L decreased [Naming Objects] : no difficulty naming common objects [Repeating Phrases] : no difficulty repeating a phrase [Writing A Sentence] : no difficulty writing a sentence [Fluency] : fluency intact [Comprehension] : comprehension intact [Reading] : reading intact [Current Events] : adequate knowledge of current events [Past History] : adequate knowledge of personal past history [Total Score ___ / 30] : the patient achieved a score of [unfilled] /30 [Date / Time ___ / 5] : date / time [unfilled] / 5 [Place ___ / 5] : place [unfilled] / 5 [Registration ___ / 3] : registration [unfilled] / 3 [Serial Sevens ___/5] : serial sevens [unfilled] / 5 [Naming 2 Objects ___ / 2] : naming two objects [unfilled] / 2 [Repeating a Sentence ___ / 1] : repeating a sentence [unfilled] / 1 [Writing a Sentence ___ / 1] : write sentence [unfilled] / 1 [3-stage Verbal Command ___ / 3] : three-stage verbal command [unfilled] / 3 [Written Command ___ / 1] : written command [unfilled] / 1 [Copy a Design ___ / 1] : copy a design [unfilled] / 1 [Recall ___ / 3] : recall [unfilled] / 3 [Cranial Nerves Optic (II)] : visual acuity intact bilaterally,  visual fields full to confrontation, pupils equal round and reactive to light [Cranial Nerves Oculomotor (III)] : extraocular motion intact [Cranial Nerves Trigeminal (V)] : facial sensation intact symmetrically [Cranial Nerves Facial (VII)] : face symmetrical [Cranial Nerves Vestibulocochlear (VIII)] : hearing was intact bilaterally [Cranial Nerves Glossopharyngeal (IX)] : tongue and palate midline [Cranial Nerves Accessory (XI - Cranial And Spinal)] : head turning and shoulder shrug symmetric [Cranial Nerves Hypoglossal (XII)] : there was no tongue deviation with protrusion [Motor Strength] : muscle strength was normal in all four extremities [Involuntary Movements] : no involuntary movements were seen [No Muscle Atrophy] : normal bulk in all four extremities [Motor Handedness Right-Handed] : the patient is right hand dominant [Sensation Tactile Decrease] : light touch was intact [Sensation Pain / Temperature Decrease] : pain and temperature was intact [Sensation Vibration Decrease] : vibration was intact [Proprioception] : proprioception was intact [Balance] : balance was intact [Tremor] : a tremor present [2+] : Ankle jerk left 2+ [Sclera] : the sclera and conjunctiva were normal [PERRL With Normal Accommodation] : pupils were equal in size, round, reactive to light, with normal accommodation [Extraocular Movements] : extraocular movements were intact [Optic Disc Abnormality] : the optic disc were normal in size and color [No APD] : no afferent pupillary defect [No MARCELLO] : no internuclear ophthalmoplegia [Full Visual Field] : full visual field [Outer Ear] : the ears and nose were normal in appearance [Oropharynx] : the oropharynx was normal [Neck Appearance] : the appearance of the neck was normal [Neck Cervical Mass (___cm)] : no neck mass was observed [Jugular Venous Distention Increased] : there was no jugular-venous distention [Thyroid Diffuse Enlargement] : the thyroid was not enlarged [Thyroid Nodule] : there were no palpable thyroid nodules [Auscultation Breath Sounds / Voice Sounds] : lungs were clear to auscultation bilaterally [Heart Rate And Rhythm] : heart rate was normal and rhythm regular [Heart Sounds] : normal S1 and S2 [Heart Sounds Gallop] : no gallops [Murmurs] : no murmurs [Heart Sounds Pericardial Friction Rub] : no pericardial rub [Arterial Pulses Carotid] : carotid pulses were normal with no bruits [Full Pulse] : the pedal pulses are present [Edema] : there was no peripheral edema [Bowel Sounds] : normal bowel sounds [Abdomen Soft] : soft [Abdomen Tenderness] : non-tender [Abdomen Mass (___ Cm)] : no abdominal mass palpated [No CVA Tenderness] : no ~M costovertebral angle tenderness [No Spinal Tenderness] : no spinal tenderness [Abnormal Walk] : normal gait [Nail Clubbing] : no clubbing  or cyanosis of the fingernails [Musculoskeletal - Swelling] : no joint swelling seen [Motor Tone] : muscle strength and tone were normal [Skin Color & Pigmentation] : normal skin color and pigmentation [Skin Turgor] : normal skin turgor [] : no rash [FreeTextEntry1] : [id number]:\par with limitation of TEB visit, exam is stable. [Motor Strength Upper Extremities Bilaterally] : strength was normal in both upper extremities [Motor Strength Lower Extremities Bilaterally] : strength was normal in both lower extremities [Romberg's Sign] : Romberg's sign was negtive [Allodynia] : no ~T allodynia present [Dysesthesia] : no dysesthesia [Hyperesthesia] : no hyperesthesia [Past-pointing] : there was no past-pointing [Plantar Reflex Right Only] : normal on the right [Plantar Reflex Left Only] : normal on the left [FreeTextEntry4] : Mental Status Exam\par Presidents: 3/5\par Alternating Pattern: ok\par Spiral: ok\par Clock: 3/3\par Repetition: ok\par \par R/L discrimination on self and examiner: ok\par Cross-line commands: ok\par Praxis:\par -Motor: ok\par -Dynamic/Luria: ok-forgets pattern\par -Ideomotor/Imitation: ok\par -Ideational/writing/closing-in: ok\par -Dressing: ok. [FreeTextEntry8] : L hand tremor, postural, fine. Cautioned gait, due to pain in L knee; no parkinsonism; march is ok, no shuffle. pivots in 3 steps.

## 2022-01-21 ENCOUNTER — APPOINTMENT (OUTPATIENT)
Dept: MRI IMAGING | Facility: CLINIC | Age: 77
End: 2022-01-21

## 2022-01-27 ENCOUNTER — APPOINTMENT (OUTPATIENT)
Dept: MRI IMAGING | Facility: CLINIC | Age: 77
End: 2022-01-27
Payer: MEDICARE

## 2022-01-27 ENCOUNTER — OUTPATIENT (OUTPATIENT)
Dept: OUTPATIENT SERVICES | Facility: HOSPITAL | Age: 77
LOS: 1 days | End: 2022-01-27
Payer: MEDICARE

## 2022-01-27 DIAGNOSIS — R26.89 OTHER ABNORMALITIES OF GAIT AND MOBILITY: ICD-10-CM

## 2022-01-27 DIAGNOSIS — F41.9 ANXIETY DISORDER, UNSPECIFIED: ICD-10-CM

## 2022-01-27 PROCEDURE — 70551 MRI BRAIN STEM W/O DYE: CPT | Mod: 26

## 2022-01-27 PROCEDURE — 70551 MRI BRAIN STEM W/O DYE: CPT

## 2022-02-28 ENCOUNTER — RX RENEWAL (OUTPATIENT)
Age: 77
End: 2022-02-28

## 2022-02-28 RX ORDER — ROSUVASTATIN CALCIUM 20 MG/1
20 TABLET, FILM COATED ORAL
Qty: 90 | Refills: 2 | Status: ACTIVE | COMMUNITY
Start: 2021-01-15 | End: 1900-01-01

## 2022-03-02 ENCOUNTER — APPOINTMENT (OUTPATIENT)
Dept: INTERNAL MEDICINE | Facility: CLINIC | Age: 77
End: 2022-03-02
Payer: MEDICARE

## 2022-03-02 VITALS
BODY MASS INDEX: 24.2 KG/M2 | DIASTOLIC BLOOD PRESSURE: 50 MMHG | SYSTOLIC BLOOD PRESSURE: 110 MMHG | OXYGEN SATURATION: 97 % | HEART RATE: 70 BPM | WEIGHT: 141 LBS

## 2022-03-02 VITALS
SYSTOLIC BLOOD PRESSURE: 130 MMHG | BODY MASS INDEX: 25.27 KG/M2 | WEIGHT: 148 LBS | HEIGHT: 64 IN | OXYGEN SATURATION: 98 % | DIASTOLIC BLOOD PRESSURE: 65 MMHG | HEART RATE: 76 BPM

## 2022-03-02 DIAGNOSIS — R25.1 TREMOR, UNSPECIFIED: ICD-10-CM

## 2022-03-02 DIAGNOSIS — I10 ESSENTIAL (PRIMARY) HYPERTENSION: ICD-10-CM

## 2022-03-02 DIAGNOSIS — I25.10 ATHEROSCLEROTIC HEART DISEASE OF NATIVE CORONARY ARTERY W/OUT ANGINA PECTORIS: ICD-10-CM

## 2022-03-02 DIAGNOSIS — H93.19 TINNITUS, UNSPECIFIED EAR: ICD-10-CM

## 2022-03-02 DIAGNOSIS — R42 DIZZINESS AND GIDDINESS: ICD-10-CM

## 2022-03-02 DIAGNOSIS — H53.8 OTHER VISUAL DISTURBANCES: ICD-10-CM

## 2022-03-02 DIAGNOSIS — R68.89 OTHER GENERAL SYMPTOMS AND SIGNS: ICD-10-CM

## 2022-03-02 DIAGNOSIS — F41.9 ANXIETY DISORDER, UNSPECIFIED: ICD-10-CM

## 2022-03-02 DIAGNOSIS — G47.00 INSOMNIA, UNSPECIFIED: ICD-10-CM

## 2022-03-02 DIAGNOSIS — E11.9 TYPE 2 DIABETES MELLITUS W/OUT COMPLICATIONS: ICD-10-CM

## 2022-03-02 DIAGNOSIS — F32.A ANXIETY DISORDER, UNSPECIFIED: ICD-10-CM

## 2022-03-02 DIAGNOSIS — K57.30 DIVERTICULOSIS OF LARGE INTESTINE W/OUT PERFORATION OR ABSCESS W/OUT BLEEDING: ICD-10-CM

## 2022-03-02 DIAGNOSIS — R26.89 OTHER ABNORMALITIES OF GAIT AND MOBILITY: ICD-10-CM

## 2022-03-02 DIAGNOSIS — Z87.19 PERSONAL HISTORY OF OTHER DISEASES OF THE DIGESTIVE SYSTEM: ICD-10-CM

## 2022-03-02 DIAGNOSIS — E78.5 HYPERLIPIDEMIA, UNSPECIFIED: ICD-10-CM

## 2022-03-02 DIAGNOSIS — Z00.00 ENCOUNTER FOR GENERAL ADULT MEDICAL EXAMINATION W/OUT ABNORMAL FINDINGS: ICD-10-CM

## 2022-03-02 PROCEDURE — 36415 COLL VENOUS BLD VENIPUNCTURE: CPT

## 2022-03-02 PROCEDURE — G0439: CPT

## 2022-03-02 PROCEDURE — 99214 OFFICE O/P EST MOD 30 MIN: CPT | Mod: 25

## 2022-03-02 RX ORDER — CANDESARTAN CILEXETIL 4 MG/1
4 TABLET ORAL DAILY
Qty: 90 | Refills: 3 | Status: ACTIVE | COMMUNITY
Start: 1900-01-01 | End: 1900-01-01

## 2022-03-02 RX ORDER — GLIMEPIRIDE 1 MG/1
1 TABLET ORAL
Qty: 90 | Refills: 1 | Status: ACTIVE | COMMUNITY
Start: 2021-10-25 | End: 1900-01-01

## 2022-03-02 RX ORDER — BUSPIRONE HYDROCHLORIDE 10 MG/1
10 TABLET ORAL
Qty: 30 | Refills: 3 | Status: ACTIVE | COMMUNITY
Start: 1900-01-01 | End: 1900-01-01

## 2022-03-02 NOTE — HEALTH RISK ASSESSMENT
[Never] : Never [No] : In the past 12 months have you used drugs other than those required for medical reasons? No [Medical reason not done] : Medical reason not done [Learning/Retaining New Information] : difficulty learning/retaining new information [Reasoning] : difficulty with reasoning [None] : None [With Family] : lives with family [Retired] : retired [Feels Safe at Home] : Feels safe at home [Fully functional (bathing, dressing, toileting, transferring, walking, feeding)] : Fully functional (bathing, dressing, toileting, transferring, walking, feeding) [Fully functional (using the telephone, shopping, preparing meals, housekeeping, doing laundry, using] : Fully functional and needs no help or supervision to perform IADLs (using the telephone, shopping, preparing meals, housekeeping, doing laundry, using transportation, managing medications and managing finances) [Smoke Detector] : smoke detector [Carbon Monoxide Detector] : carbon monoxide detector [Seat Belt] :  uses seat belt [Audit-CScore] : 0 [de-identified] : dementia [Sexually Active] : not sexually active [Reports changes in hearing] : Reports no changes in hearing [Reports changes in vision] : Reports no changes in vision [Reports changes in dental health] : Reports no changes in dental health

## 2022-03-02 NOTE — ASSESSMENT
[FreeTextEntry1] : Discussed with the patient health care maintenance.  \par Discussed age and condition appropriate vaccinations.  \par Discussed age appropriate cancer screening testing as indicated including colon cancer screening/colonoscopy, cervical cancer screening/PAP testing, breast cancer screening/mammogram and skin cancer screening.  \par Discussed protection from the sun.  \par Discussed healthy diet, exercise and weight control for health.\par Discussed healthy habits including abstaining from smoking and drugs and abstention/moderation with alcohol.\par Discussed routine regular ophthalmology and dental follow up.\par Routine labs discussed with the patient and sent. 
n/a

## 2022-03-02 NOTE — HISTORY OF PRESENT ILLNESS
[Other: _____] : [unfilled] [FreeTextEntry1] : annual PE\par dm, htn, hld, forgetfulness, tremor, vertigo, dep/anx [de-identified] : Seen with son translating\par 76 y/o female with a hx of dm, hld, htn, cad s/p rca thrombectomy and LANCE 4/20, diverticular dz, Vertigo, tremor, forgetfulness\par here for her medical issues and annual PE.\par Pt with continued mental deterioration.  Has been following with neurology.  Had f/u MRI and needs to get labs sent.  notes reviewed.\par Has followed up with Dr Grove - note reviewed.\par Has been having irritation and blurry vision at the left eye.  \par with no other c/o besides the tremors, vertigo, and tinnitus.\par Has not made appt with ENT.  \par Taking meds\par No CV c/o.\par No GI c/o.\par No other neuro sx.\par Has been having some pain at the lower legs.  Has been having continued knee pains.  with occ balance loss.  \par living with family.\par No tob.\par Taking meds.\par Walks regularly, diet healthy\par \par Vaccines - reported as up to date - to request records. \par colon - ? 2 years ago. did not f/u after the ER visit.\par mammo - due - re-ordered.

## 2022-03-02 NOTE — REVIEW OF SYSTEMS
[Hearing Loss] : hearing loss [Headache] : headache [Dizziness] : dizziness [Confusion] : confusion [Memory Loss] : memory loss [Negative] : Heme/Lymph [Discharge] : discharge [Vision Problems] : vision problems [Anxiety] : anxiety [Depression] : depression [Earache] : no earache [Nosebleed] : no nosebleeds [Hoarseness] : no hoarseness [Nasal Discharge] : no nasal discharge [Sore Throat] : no sore throat [Postnasal Drip] : no postnasal drip [FreeTextEntry4] : tinnitus [FreeTextEntry9] : leg cherien [de-identified] : tremor

## 2022-03-02 NOTE — COUNSELING
[Limited decision making ability] : Limited decision making ability [Needs reinforcement, provided] : Patient needs reinforcement on understanding of lifestyle changes and steps needed to achieve self management goal; reinforcement was provided [Fall prevention counseling provided] : Fall prevention counseling provided [Adequate lighting] : Adequate lighting [No throw rugs] : No throw rugs [Use proper foot wear] : Use proper foot wear [Use recommended devices] : Use recommended devices

## 2022-03-02 NOTE — PHYSICAL EXAM
[No Acute Distress] : no acute distress [Well Nourished] : well nourished [Well Developed] : well developed [Well-Appearing] : well-appearing [Normal Sclera/Conjunctiva] : normal sclera/conjunctiva [PERRL] : pupils equal round and reactive to light [EOMI] : extraocular movements intact [Normal Outer Ear/Nose] : the outer ears and nose were normal in appearance [Normal Oropharynx] : the oropharynx was normal [Normal TMs] : both tympanic membranes were normal [No JVD] : no jugular venous distention [No Lymphadenopathy] : no lymphadenopathy [Thyroid Normal, No Nodules] : the thyroid was normal and there were no nodules present [Supple] : supple [No Respiratory Distress] : no respiratory distress  [No Accessory Muscle Use] : no accessory muscle use [Clear to Auscultation] : lungs were clear to auscultation bilaterally [Normal Rate] : normal rate  [Regular Rhythm] : with a regular rhythm [Normal S1, S2] : normal S1 and S2 [No Murmur] : no murmur heard [No Carotid Bruits] : no carotid bruits [No Abdominal Bruit] : a ~M bruit was not heard ~T in the abdomen [Pedal Pulses Present] : the pedal pulses are present [No Edema] : there was no peripheral edema [No Palpable Aorta] : no palpable aorta [Soft] : abdomen soft [Non Tender] : non-tender [Non-distended] : non-distended [No Masses] : no abdominal mass palpated [No HSM] : no HSM [Normal Bowel Sounds] : normal bowel sounds [Normal Posterior Cervical Nodes] : no posterior cervical lymphadenopathy [Normal Anterior Cervical Nodes] : no anterior cervical lymphadenopathy [No CVA Tenderness] : no CVA  tenderness [No Spinal Tenderness] : no spinal tenderness [No Joint Swelling] : no joint swelling [Grossly Normal Strength/Tone] : grossly normal strength/tone [No Rash] : no rash [Coordination Grossly Intact] : coordination grossly intact [No Focal Deficits] : no focal deficits [Normal Gait] : normal gait [Speech Grossly Normal] : speech grossly normal [Normal Affect] : the affect was normal [Normal Mood] : the mood was normal [Normal Insight/Judgement] : insight and judgment were intact [Normal Voice/Communication] : normal voice/communication [de-identified] : calm, taking and interacting with her son.  Following commands, discussing sx [de-identified] : mild tremor at the hands [de-identified] : as above, interactive, following commands.

## 2022-04-21 ENCOUNTER — APPOINTMENT (OUTPATIENT)
Dept: CARDIOLOGY | Facility: CLINIC | Age: 77
End: 2022-04-21

## 2023-06-16 NOTE — ED PROVIDER NOTE - CARE PROVIDER_API CALL
6/16/2023         RE: Kimmie Carranza  [address] [place] [address]        Dear Colleague,    Thank you for referring your patient, Kimmie Carranza, to the [facility] RADIATION ONCOLOGY. Please see a copy of my visit note below.    Kimmie Carranza is here for scheduled HDR brachytherapy    HDR Single Channel Cylinder  4    Pre-procedure-  Time out done by Ena Castillo and Jaycee Garcia.   Patient identified, arm band applied, signed consent available   Medications taken by patient prior to procedure NA  Pain assessment: Denies  There were no vitals taken for this visit.    Post-procedure-  Pain assessment: Denies   Device removed without difficulty, Education for possible side effects and management, Follow-up scheduled and Discharged to home        A radiation therapy treatment planning simulation was performed.  HDR brachytherapy vaginal cylinder 4 of 5.  Please see the [facility] record for documentation.    Jaycee Garcia MD  Radiation Oncology      Again, thank you for allowing me to participate in the care of your patient.        Sincerely,        Jaycee Garcia MD    
[facility]  INTERNAL MEDICINE  [address] [place] [address], [address]  [place] [address]  Phone: [phone]  Fax: [phone]  Follow Up Time:     [facility]  CARDIOLOGY  [address] [place] [address]  Phone: [phone]  Fax: [phone]  Follow Up Time:

## 2023-08-14 ENCOUNTER — EMERGENCY (EMERGENCY)
Facility: HOSPITAL | Age: 78
LOS: 0 days | Discharge: LEFT BEFORE DISCHARGE | End: 2023-08-15
Attending: EMERGENCY MEDICINE | Admitting: STUDENT IN AN ORGANIZED HEALTH CARE EDUCATION/TRAINING PROGRAM
Payer: MEDICARE

## 2023-08-14 VITALS
RESPIRATION RATE: 18 BRPM | HEART RATE: 75 BPM | TEMPERATURE: 99 F | SYSTOLIC BLOOD PRESSURE: 135 MMHG | OXYGEN SATURATION: 98 % | DIASTOLIC BLOOD PRESSURE: 74 MMHG | WEIGHT: 144.4 LBS

## 2023-08-14 DIAGNOSIS — E11.9 TYPE 2 DIABETES MELLITUS WITHOUT COMPLICATIONS: ICD-10-CM

## 2023-08-14 DIAGNOSIS — Z95.1 PRESENCE OF AORTOCORONARY BYPASS GRAFT: ICD-10-CM

## 2023-08-14 DIAGNOSIS — R07.9 CHEST PAIN, UNSPECIFIED: ICD-10-CM

## 2023-08-14 DIAGNOSIS — R07.89 OTHER CHEST PAIN: ICD-10-CM

## 2023-08-14 DIAGNOSIS — F41.9 ANXIETY DISORDER, UNSPECIFIED: ICD-10-CM

## 2023-08-14 DIAGNOSIS — Z87.81 PERSONAL HISTORY OF (HEALED) TRAUMATIC FRACTURE: Chronic | ICD-10-CM

## 2023-08-14 DIAGNOSIS — I10 ESSENTIAL (PRIMARY) HYPERTENSION: ICD-10-CM

## 2023-08-14 DIAGNOSIS — I25.10 ATHEROSCLEROTIC HEART DISEASE OF NATIVE CORONARY ARTERY WITHOUT ANGINA PECTORIS: ICD-10-CM

## 2023-08-14 DIAGNOSIS — Z95.1 PRESENCE OF AORTOCORONARY BYPASS GRAFT: Chronic | ICD-10-CM

## 2023-08-14 DIAGNOSIS — Z53.21 PROCEDURE AND TREATMENT NOT CARRIED OUT DUE TO PATIENT LEAVING PRIOR TO BEING SEEN BY HEALTH CARE PROVIDER: ICD-10-CM

## 2023-08-14 DIAGNOSIS — E78.00 PURE HYPERCHOLESTEROLEMIA, UNSPECIFIED: ICD-10-CM

## 2023-08-14 LAB
ALBUMIN SERPL ELPH-MCNC: 3.6 G/DL — SIGNIFICANT CHANGE UP (ref 3.3–5)
ALP SERPL-CCNC: 70 U/L — SIGNIFICANT CHANGE UP (ref 40–120)
ALT FLD-CCNC: 19 U/L — SIGNIFICANT CHANGE UP (ref 12–78)
ANION GAP SERPL CALC-SCNC: 9 MMOL/L — SIGNIFICANT CHANGE UP (ref 5–17)
APPEARANCE UR: CLEAR — SIGNIFICANT CHANGE UP
AST SERPL-CCNC: 14 U/L — LOW (ref 15–37)
BACTERIA # UR AUTO: ABNORMAL
BASOPHILS # BLD AUTO: 0.02 K/UL — SIGNIFICANT CHANGE UP (ref 0–0.2)
BASOPHILS NFR BLD AUTO: 0.3 % — SIGNIFICANT CHANGE UP (ref 0–2)
BILIRUB SERPL-MCNC: 0.3 MG/DL — SIGNIFICANT CHANGE UP (ref 0.2–1.2)
BILIRUB UR-MCNC: NEGATIVE — SIGNIFICANT CHANGE UP
BUN SERPL-MCNC: 10 MG/DL — SIGNIFICANT CHANGE UP (ref 7–23)
CALCIUM SERPL-MCNC: 9.8 MG/DL — SIGNIFICANT CHANGE UP (ref 8.5–10.1)
CHLORIDE SERPL-SCNC: 99 MMOL/L — SIGNIFICANT CHANGE UP (ref 96–108)
CK MB BLD-MCNC: <3.4 % — SIGNIFICANT CHANGE UP (ref 0–3.5)
CK MB CFR SERPL CALC: <1 NG/ML — SIGNIFICANT CHANGE UP (ref 0.5–3.6)
CK SERPL-CCNC: 29 U/L — SIGNIFICANT CHANGE UP (ref 26–192)
CO2 SERPL-SCNC: 27 MMOL/L — SIGNIFICANT CHANGE UP (ref 22–31)
COLOR SPEC: YELLOW — SIGNIFICANT CHANGE UP
CREAT SERPL-MCNC: 0.68 MG/DL — SIGNIFICANT CHANGE UP (ref 0.5–1.3)
D DIMER BLD IA.RAPID-MCNC: 155 NG/ML DDU — SIGNIFICANT CHANGE UP
DIFF PNL FLD: NEGATIVE — SIGNIFICANT CHANGE UP
EGFR: 91 ML/MIN/1.73M2 — SIGNIFICANT CHANGE UP
EOSINOPHIL # BLD AUTO: 0.13 K/UL — SIGNIFICANT CHANGE UP (ref 0–0.5)
EOSINOPHIL NFR BLD AUTO: 1.7 % — SIGNIFICANT CHANGE UP (ref 0–6)
EPI CELLS # UR: SIGNIFICANT CHANGE UP
GLUCOSE BLDC GLUCOMTR-MCNC: 117 MG/DL — HIGH (ref 70–99)
GLUCOSE SERPL-MCNC: 107 MG/DL — HIGH (ref 70–99)
GLUCOSE UR QL: NEGATIVE MG/DL — SIGNIFICANT CHANGE UP
HCT VFR BLD CALC: 38.5 % — SIGNIFICANT CHANGE UP (ref 34.5–45)
HGB BLD-MCNC: 13.4 G/DL — SIGNIFICANT CHANGE UP (ref 11.5–15.5)
IMM GRANULOCYTES NFR BLD AUTO: 0.1 % — SIGNIFICANT CHANGE UP (ref 0–0.9)
KETONES UR-MCNC: NEGATIVE — SIGNIFICANT CHANGE UP
LEUKOCYTE ESTERASE UR-ACNC: ABNORMAL
LYMPHOCYTES # BLD AUTO: 2.76 K/UL — SIGNIFICANT CHANGE UP (ref 1–3.3)
LYMPHOCYTES # BLD AUTO: 37 % — SIGNIFICANT CHANGE UP (ref 13–44)
MCHC RBC-ENTMCNC: 34.4 PG — HIGH (ref 27–34)
MCHC RBC-ENTMCNC: 34.8 G/DL — SIGNIFICANT CHANGE UP (ref 32–36)
MCV RBC AUTO: 98.7 FL — SIGNIFICANT CHANGE UP (ref 80–100)
MONOCYTES # BLD AUTO: 0.52 K/UL — SIGNIFICANT CHANGE UP (ref 0–0.9)
MONOCYTES NFR BLD AUTO: 7 % — SIGNIFICANT CHANGE UP (ref 2–14)
NEUTROPHILS # BLD AUTO: 4.02 K/UL — SIGNIFICANT CHANGE UP (ref 1.8–7.4)
NEUTROPHILS NFR BLD AUTO: 53.9 % — SIGNIFICANT CHANGE UP (ref 43–77)
NITRITE UR-MCNC: POSITIVE
NRBC # BLD: 0 /100 WBCS — SIGNIFICANT CHANGE UP (ref 0–0)
PH UR: 7 — SIGNIFICANT CHANGE UP (ref 5–8)
PLATELET # BLD AUTO: 237 K/UL — SIGNIFICANT CHANGE UP (ref 150–400)
POTASSIUM SERPL-MCNC: 3.9 MMOL/L — SIGNIFICANT CHANGE UP (ref 3.5–5.3)
POTASSIUM SERPL-SCNC: 3.9 MMOL/L — SIGNIFICANT CHANGE UP (ref 3.5–5.3)
PROT SERPL-MCNC: 7.2 GM/DL — SIGNIFICANT CHANGE UP (ref 6–8.3)
PROT UR-MCNC: 15 MG/DL
RBC # BLD: 3.9 M/UL — SIGNIFICANT CHANGE UP (ref 3.8–5.2)
RBC # FLD: 12.3 % — SIGNIFICANT CHANGE UP (ref 10.3–14.5)
RBC CASTS # UR COMP ASSIST: NEGATIVE /HPF — SIGNIFICANT CHANGE UP (ref 0–4)
SODIUM SERPL-SCNC: 135 MMOL/L — SIGNIFICANT CHANGE UP (ref 135–145)
SP GR SPEC: 1.01 — SIGNIFICANT CHANGE UP (ref 1.01–1.02)
TROPONIN I, HIGH SENSITIVITY RESULT: 4.4 NG/L — SIGNIFICANT CHANGE UP
TROPONIN I, HIGH SENSITIVITY RESULT: 5.7 NG/L — SIGNIFICANT CHANGE UP
UROBILINOGEN FLD QL: NEGATIVE MG/DL — SIGNIFICANT CHANGE UP
WBC # BLD: 7.46 K/UL — SIGNIFICANT CHANGE UP (ref 3.8–10.5)
WBC # FLD AUTO: 7.46 K/UL — SIGNIFICANT CHANGE UP (ref 3.8–10.5)
WBC UR QL: ABNORMAL

## 2023-08-14 PROCEDURE — 99282 EMERGENCY DEPT VISIT SF MDM: CPT

## 2023-08-14 PROCEDURE — 71045 X-RAY EXAM CHEST 1 VIEW: CPT | Mod: 26

## 2023-08-14 PROCEDURE — 93010 ELECTROCARDIOGRAM REPORT: CPT | Mod: 77

## 2023-08-14 PROCEDURE — 93010 ELECTROCARDIOGRAM REPORT: CPT

## 2023-08-14 PROCEDURE — 99285 EMERGENCY DEPT VISIT HI MDM: CPT

## 2023-08-14 RX ORDER — GLUCAGON INJECTION, SOLUTION 0.5 MG/.1ML
1 INJECTION, SOLUTION SUBCUTANEOUS ONCE
Refills: 0 | Status: DISCONTINUED | OUTPATIENT
Start: 2023-08-14 | End: 2023-08-15

## 2023-08-14 RX ORDER — DEXTROSE 50 % IN WATER 50 %
12.5 SYRINGE (ML) INTRAVENOUS ONCE
Refills: 0 | Status: DISCONTINUED | OUTPATIENT
Start: 2023-08-14 | End: 2023-08-15

## 2023-08-14 RX ORDER — GLIMEPIRIDE 1 MG
0 TABLET ORAL
Qty: 0 | Refills: 1 | DISCHARGE

## 2023-08-14 RX ORDER — DEXTROSE 50 % IN WATER 50 %
15 SYRINGE (ML) INTRAVENOUS ONCE
Refills: 0 | Status: DISCONTINUED | OUTPATIENT
Start: 2023-08-14 | End: 2023-08-15

## 2023-08-14 RX ORDER — OFLOXACIN 0.3 %
1 DROPS OPHTHALMIC (EYE) THREE TIMES A DAY
Refills: 0 | Status: DISCONTINUED | OUTPATIENT
Start: 2023-08-14 | End: 2023-08-15

## 2023-08-14 RX ORDER — INSULIN LISPRO 100/ML
VIAL (ML) SUBCUTANEOUS AT BEDTIME
Refills: 0 | Status: DISCONTINUED | OUTPATIENT
Start: 2023-08-14 | End: 2023-08-15

## 2023-08-14 RX ORDER — ASPIRIN/CALCIUM CARB/MAGNESIUM 324 MG
81 TABLET ORAL DAILY
Refills: 0 | Status: DISCONTINUED | OUTPATIENT
Start: 2023-08-14 | End: 2023-08-15

## 2023-08-14 RX ORDER — INSULIN LISPRO 100/ML
VIAL (ML) SUBCUTANEOUS
Refills: 0 | Status: DISCONTINUED | OUTPATIENT
Start: 2023-08-14 | End: 2023-08-15

## 2023-08-14 RX ORDER — SODIUM CHLORIDE 9 MG/ML
1000 INJECTION, SOLUTION INTRAVENOUS
Refills: 0 | Status: DISCONTINUED | OUTPATIENT
Start: 2023-08-14 | End: 2023-08-15

## 2023-08-14 RX ORDER — ACETAMINOPHEN 500 MG
650 TABLET ORAL EVERY 6 HOURS
Refills: 0 | Status: DISCONTINUED | OUTPATIENT
Start: 2023-08-14 | End: 2023-08-15

## 2023-08-14 RX ORDER — METFORMIN HYDROCHLORIDE 850 MG/1
0 TABLET ORAL
Qty: 0 | Refills: 0 | DISCHARGE

## 2023-08-14 RX ORDER — DEXTROSE 50 % IN WATER 50 %
25 SYRINGE (ML) INTRAVENOUS ONCE
Refills: 0 | Status: DISCONTINUED | OUTPATIENT
Start: 2023-08-14 | End: 2023-08-15

## 2023-08-14 RX ORDER — LANOLIN ALCOHOL/MO/W.PET/CERES
3 CREAM (GRAM) TOPICAL AT BEDTIME
Refills: 0 | Status: DISCONTINUED | OUTPATIENT
Start: 2023-08-14 | End: 2023-08-15

## 2023-08-14 RX ORDER — EMPAGLIFLOZIN 10 MG/1
0 TABLET, FILM COATED ORAL
Qty: 0 | Refills: 2 | DISCHARGE

## 2023-08-14 RX ORDER — TRAMADOL HYDROCHLORIDE 50 MG/1
50 TABLET ORAL EVERY 6 HOURS
Refills: 0 | Status: DISCONTINUED | OUTPATIENT
Start: 2023-08-14 | End: 2023-08-15

## 2023-08-14 RX ADMIN — Medication 1 DROP(S): at 22:30

## 2023-08-14 NOTE — H&P ADULT - NSICDXPASTMEDICALHX_GEN_ALL_CORE_FT
PAST MEDICAL HISTORY:  CAD (coronary artery disease)     HLD (hyperlipidemia)     HTN (hypertension)     Presence of stent of CABG     Type 2 diabetes mellitus

## 2023-08-14 NOTE — ED ADULT NURSE NOTE - NSFALLUNIVINTERV_ED_ALL_ED
Bed/Stretcher in lowest position, wheels locked, appropriate side rails in place/Call bell, personal items and telephone in reach/Instruct patient to call for assistance before getting out of bed/chair/stretcher/Non-slip footwear applied when patient is off stretcher/[place] to call system/Physically safe environment - no spills, clutter or unnecessary equipment/Purposeful proactive rounding/Room/bathroom lighting operational, light cord in reach

## 2023-08-14 NOTE — H&P ADULT - NSHPPHYSICALEXAM_GEN_ALL_CORE
PHYSICAL EXAM:    Vital Signs Last 24 Hrs  T(C): 37 (14 Aug 2023 20:49), Max: 37.1 (14 Aug 2023 12:59)  T(F): 98.6 (14 Aug 2023 20:49), Max: 98.7 (14 Aug 2023 12:59)  HR: 67 (14 Aug 2023 20:49) (67 - 76)  BP: 145/55 (14 Aug 2023 20:49) (135/74 - 157/60)  BP(mean): --  RR: 15 (14 Aug 2023 20:49) (15 - 18)  SpO2: 98% (14 Aug 2023 20:49) (98% - 99%)    Parameters below as of 14 Aug 2023 20:49  Patient On (Oxygen Delivery Method): room air        GENERAL: Pt lying in bed comfortably in NAD  HEENT:  Atraumatic, EOMI, PERRL, conjunctiva and sclera clear, MMM  NECK: Supple, No JVD  CHEST/LUNG: Clear to auscultation bilaterally; No rales, rhonchi, wheezing or rubs. Unlabored respirations  HEART: Regular rate and rhythm; No murmurs, rubs, or gallops  ABDOMEN: Bowel sounds present; Soft, Nontender, Nondistended. No guarding or rigidity    EXTREMITIES:  2+ Peripheral Pulses, brisk capillary refill. No clubbing, cyanosis, or edema  NEUROLOGICAL:  Alert & Oriented X3, speech clear. Answers questions appropriately. Full and equal strength B/L upper and lower extremities. No deficits   MSK: FROM x 4 extremities   SKIN: No rashes or lesions PHYSICAL EXAM:    Vital Signs Last 24 Hrs  T(C): 37 (14 Aug 2023 20:49), Max: 37.1 (14 Aug 2023 12:59)  T(F): 98.6 (14 Aug 2023 20:49), Max: 98.7 (14 Aug 2023 12:59)  HR: 67 (14 Aug 2023 20:49) (67 - 76)  BP: 145/55 (14 Aug 2023 20:49) (135/74 - 157/60)  BP(mean): --  RR: 15 (14 Aug 2023 20:49) (15 - 18)  SpO2: 98% (14 Aug 2023 20:49) (98% - 99%)    Parameters below as of 14 Aug 2023 20:49  Patient On (Oxygen Delivery Method): room air        GENERAL: Pt lying in bed comfortably in NAD  HEENT:  Atraumatic, EOMI, PERRL, conjunctiva mildly injected, MMM  NECK: Supple  CHEST/LUNG: Clear to auscultation bilaterally  HEART: Regular rate and rhythm;  ABDOMEN: Bowel sounds present; Soft, Nontender, Nondistended.  EXTREMITIES:  2+ Peripheral Pulses, brisk capillary refill. No edema  NEUROLOGICAL:  Alert & Oriented X3. No deficits   MSK: FROM x 4 extremities   SKIN: No rashes or lesions

## 2023-08-14 NOTE — ED ADULT NURSE NOTE - OBJECTIVE STATEMENT
pt c/o chest pain, shortness of breath and  left hip pain radiating to the left leg and b/l eye redness and tearing for month. symptom worst today. history of dm, anxiety, open heart surgery last year([place] 2022) .

## 2023-08-14 NOTE — ED PROVIDER NOTE - PHYSICAL EXAMINATION
[place]:  General: No distress.  Mentation at baseline.   HEENT: WNL  Chest/Lungs: CTAB, No wheeze, No retractions, No increased work of breathing, Normal rate  Heart: S1S2 RRR, No M/R/G, Pules equal Bilaterally in upper and lower extremities distally  Abd: soft, NT/ND, No guarding, No rebound.  No hernias, no palpable masses.  Extrem: FROM in all joints, no significant edema noted, No ulcers.  Cap refil < 2sec.  Skin: No rash noted, warm dry.  Neuro:  Grossly normal.  No difficulty ambulating. No focal deficits.  Psychiatric: No evidence of delusions. No SI/HI.

## 2023-08-14 NOTE — H&P ADULT - HISTORY OF PRESENT ILLNESS
78 y/o F w/ PMHx of HTN, CAD s/p CABG, Anxiety presents to the ED c/o chest pain.       78 y/o F w/ PMHx of HTN, HLD, DM type 2, CAD s/p PCI and s/p CABG in August last yr in [place], Anxiety presents to the ED c/o chest pain. Pt c/o L sided chest pain for the past month. Pt unable to qualify pain however states it radiates to her throat and sometimes feel like she is choking. Pain comes on at rest is associated w/ dizziness, SOB and palpitations. Pt unsure what medications she takes and states she ran out of them recently.       76 y/o F w/ PMHx of HTN, HLD, DM type 2, CAD s/p PCI and s/p CABG in August last yr in [place], Anxiety presents to the ED c/o chest pain. Pt c/o L sided chest pain for the past month. Pt unable to qualify pain however states it radiates to her throat and sometimes feel like she is choking; 8/10 at max. Pain comes on at rest is associated w/ dizziness, SOB and palpitations. Pt unsure what medications she takes and states she ran out of them recently.

## 2023-08-14 NOTE — H&P ADULT - ASSESSMENT
76 y/o F w/ PMHx of HTN, HLD, DM type 2, CAD s/p PCI and s/p CABG in August last yr in [place], Depression presents to the ED c/o chest pain.    # Chest pain  - tele monitoring  - ASA, statin  - pt high risk and hx highly suspicious, also EKG changes new compared to last one in chart, NPO after mn for stress in am  - son to bring in home med list in am  - Cardio consult in am; pls call    # DM type 2  - FS qAC and HS w/ SSI  - f/u A1c    # HTN/HLD  - son to bring in med list in am    # DVT ppx - low risk, short-term stay

## 2023-08-14 NOTE — ED ADULT TRIAGE NOTE - CHIEF COMPLAINT QUOTE
pt c/o chest pain, shortness of breath,  left hip pain radiating to the left leg and b/l eye redness and tearing for month. symptom worst today. history of dm, anxiety, open heart surgery last year.

## 2023-08-14 NOTE — ED PROVIDER NOTE - OBJECTIVE STATEMENT
77-year-old complaining about chest pain today.  Patient is 1 year removed from open heart surgery.  Patient states his pain is pressure-like and squeezing with some shortness of breath.  Nonradiating. Pain associated with exertion with mild shortness of breath

## 2023-08-14 NOTE — H&P ADULT - NSHPLABSRESULTS_GEN_ALL_CORE
T(C): 37 (08-14-23 @ 20:49), Max: 37.1 (08-14-23 @ 12:59)  HR: 67 (08-14-23 @ 20:49) (67 - 76)  BP: 145/55 (08-14-23 @ 20:49) (135/74 - 157/60)  RR: 15 (08-14-23 @ 20:49) (15 - 18)  SpO2: 98% (08-14-23 @ 20:49) (98% - 99%)                        13.4   7.46  )-----------( 237      ( 14 Aug 2023 14:50 )             38.5     08-14    135  |  99  |  10  ----------------------------<  107<H>  3.9   |  27  |  0.68    Ca    9.8      14 Aug 2023 16:15    TPro  7.2  /  Alb  3.6  /  TBili  0.3  /  DBili  x   /  AST  14<L>  /  ALT  19  /  AlkPhos  70  08-14    LIVER FUNCTIONS - ( 14 Aug 2023 16:15 )  Alb: 3.6 g/dL / Pro: 7.2 gm/dL / ALK PHOS: 70 U/L / ALT: 19 U/L / AST: 14 U/L / GGT: x             Urinalysis Basic - ( 14 Aug 2023 16:15 )    Color: x / Appearance: x / SG: x / pH: x  Gluc: 107 mg/dL / Ketone: x  / Bili: x / Urobili: x   Blood: x / Protein: x / Nitrite: x   Leuk Esterase: x / RBC: x / WBC x   Sq Epi: x / Non Sq Epi: x / Bacteria: x          acetaminophen     Tablet .. 650 milliGRAM(s) Oral every 6 hours PRN  aspirin enteric coated 81 milliGRAM(s) Oral daily  dextrose 5%. 1000 milliLiter(s) IV Continuous <Continuous>  dextrose 5%. 1000 milliLiter(s) IV Continuous <Continuous>  dextrose 50% Injectable 25 Gram(s) IV Push once  dextrose 50% Injectable 12.5 Gram(s) IV Push once  dextrose 50% Injectable 25 Gram(s) IV Push once  dextrose Oral Gel 15 Gram(s) Oral once PRN  glucagon  Injectable 1 milliGRAM(s) IntraMuscular once  insulin lispro (ADMELOG) corrective regimen sliding scale   SubCutaneous at bedtime  insulin lispro (ADMELOG) corrective regimen sliding scale   SubCutaneous three times a day before meals  melatonin 3 milliGRAM(s) Oral at bedtime PRN  ofloxacin 0.3% Solution 1 Drop(s) Both EYES three times a day  traMADol 50 milliGRAM(s) Oral every 6 hours PRN T(C): 37 (08-14-23 @ 20:49), Max: 37.1 (08-14-23 @ 12:59)  HR: 67 (08-14-23 @ 20:49) (67 - 76)  BP: 145/55 (08-14-23 @ 20:49) (135/74 - 157/60)  RR: 15 (08-14-23 @ 20:49) (15 - 18)  SpO2: 98% (08-14-23 @ 20:49) (98% - 99%)                        13.4   7.46  )-----------( 237      ( 14 Aug 2023 14:50 )             38.5     08-14    135  |  99  |  10  ----------------------------<  107<H>  3.9   |  27  |  0.68    Ca    9.8      14 Aug 2023 16:15    TPro  7.2  /  Alb  3.6  /  TBili  0.3  /  DBili  x   /  AST  14<L>  /  ALT  19  /  AlkPhos  70  08-14    LIVER FUNCTIONS - ( 14 Aug 2023 16:15 )  Alb: 3.6 g/dL / Pro: 7.2 gm/dL / ALK PHOS: 70 U/L / ALT: 19 U/L / AST: 14 U/L / GGT: x             Urinalysis Basic - ( 14 Aug 2023 16:15 )    Color: x / Appearance: x / SG: x / pH: x  Gluc: 107 mg/dL / Ketone: x  / Bili: x / Urobili: x   Blood: x / Protein: x / Nitrite: x   Leuk Esterase: x / RBC: x / WBC x   Sq Epi: x / Non Sq Epi: x / Bacteria: x    EKG - NSR at 62 bpm T wave Inv in III, aVF, V3-V6 (new compared to last one in chart)      acetaminophen     Tablet .. 650 milliGRAM(s) Oral every 6 hours PRN  aspirin enteric coated 81 milliGRAM(s) Oral daily  dextrose 5%. 1000 milliLiter(s) IV Continuous <Continuous>  dextrose 5%. 1000 milliLiter(s) IV Continuous <Continuous>  dextrose 50% Injectable 25 Gram(s) IV Push once  dextrose 50% Injectable 12.5 Gram(s) IV Push once  dextrose 50% Injectable 25 Gram(s) IV Push once  dextrose Oral Gel 15 Gram(s) Oral once PRN  glucagon  Injectable 1 milliGRAM(s) IntraMuscular once  insulin lispro (ADMELOG) corrective regimen sliding scale   SubCutaneous at bedtime  insulin lispro (ADMELOG) corrective regimen sliding scale   SubCutaneous three times a day before meals  melatonin 3 milliGRAM(s) Oral at bedtime PRN  ofloxacin 0.3% Solution 1 Drop(s) Both EYES three times a day  traMADol 50 milliGRAM(s) Oral every 6 hours PRN

## 2023-08-14 NOTE — ED ADULT NURSE REASSESSMENT NOTE - NS ED NURSE REASSESS COMMENT FT1
pt resting comfortably on stretcher, on CCM, son at bedside. v/s stable, awaiting tele admission, nursing care continues, given dinner tray.

## 2023-08-15 VITALS
HEART RATE: 85 BPM | OXYGEN SATURATION: 97 % | DIASTOLIC BLOOD PRESSURE: 65 MMHG | SYSTOLIC BLOOD PRESSURE: 134 MMHG | RESPIRATION RATE: 18 BRPM | TEMPERATURE: 99 F

## 2023-08-15 LAB
A1C WITH ESTIMATED AVERAGE GLUCOSE RESULT: 7.4 % — HIGH (ref 4–5.6)
ANION GAP SERPL CALC-SCNC: 6 MMOL/L — SIGNIFICANT CHANGE UP (ref 5–17)
BUN SERPL-MCNC: 11 MG/DL — SIGNIFICANT CHANGE UP (ref 7–23)
CALCIUM SERPL-MCNC: 9 MG/DL — SIGNIFICANT CHANGE UP (ref 8.5–10.1)
CHLORIDE SERPL-SCNC: 101 MMOL/L — SIGNIFICANT CHANGE UP (ref 96–108)
CO2 SERPL-SCNC: 27 MMOL/L — SIGNIFICANT CHANGE UP (ref 22–31)
CREAT SERPL-MCNC: 0.58 MG/DL — SIGNIFICANT CHANGE UP (ref 0.5–1.3)
EGFR: 94 ML/MIN/1.73M2 — SIGNIFICANT CHANGE UP
ESTIMATED AVERAGE GLUCOSE: 166 MG/DL — HIGH (ref 68–114)
GLUCOSE BLDC GLUCOMTR-MCNC: 132 MG/DL — HIGH (ref 70–99)
GLUCOSE BLDC GLUCOMTR-MCNC: 225 MG/DL — HIGH (ref 70–99)
GLUCOSE SERPL-MCNC: 141 MG/DL — HIGH (ref 70–99)
HCT VFR BLD CALC: 35.5 % — SIGNIFICANT CHANGE UP (ref 34.5–45)
HCV AB S/CO SERPL IA: 0.12 S/CO — SIGNIFICANT CHANGE UP (ref 0–0.99)
HCV AB SERPL-IMP: SIGNIFICANT CHANGE UP
HGB BLD-MCNC: 12.4 G/DL — SIGNIFICANT CHANGE UP (ref 11.5–15.5)
MAGNESIUM SERPL-MCNC: 2.1 MG/DL — SIGNIFICANT CHANGE UP (ref 1.6–2.6)
MCHC RBC-ENTMCNC: 34.1 PG — HIGH (ref 27–34)
MCHC RBC-ENTMCNC: 34.9 G/DL — SIGNIFICANT CHANGE UP (ref 32–36)
MCV RBC AUTO: 97.5 FL — SIGNIFICANT CHANGE UP (ref 80–100)
NRBC # BLD: 0 /100 WBCS — SIGNIFICANT CHANGE UP (ref 0–0)
PHOSPHATE SERPL-MCNC: 3.6 MG/DL — SIGNIFICANT CHANGE UP (ref 2.5–4.5)
PLATELET # BLD AUTO: 246 K/UL — SIGNIFICANT CHANGE UP (ref 150–400)
POTASSIUM SERPL-MCNC: 3.5 MMOL/L — SIGNIFICANT CHANGE UP (ref 3.5–5.3)
POTASSIUM SERPL-SCNC: 3.5 MMOL/L — SIGNIFICANT CHANGE UP (ref 3.5–5.3)
RBC # BLD: 3.64 M/UL — LOW (ref 3.8–5.2)
RBC # FLD: 12.2 % — SIGNIFICANT CHANGE UP (ref 10.3–14.5)
SODIUM SERPL-SCNC: 134 MMOL/L — LOW (ref 135–145)
TROPONIN I, HIGH SENSITIVITY RESULT: 5.6 NG/L — SIGNIFICANT CHANGE UP
WBC # BLD: 7.05 K/UL — SIGNIFICANT CHANGE UP (ref 3.8–10.5)
WBC # FLD AUTO: 7.05 K/UL — SIGNIFICANT CHANGE UP (ref 3.8–10.5)

## 2023-08-15 PROCEDURE — ZZZZZ: CPT

## 2023-08-15 PROCEDURE — 99282 EMERGENCY DEPT VISIT SF MDM: CPT

## 2023-08-15 PROCEDURE — 78452 HT MUSCLE IMAGE SPECT MULT: CPT | Mod: 26

## 2023-08-15 RX ORDER — CLOPIDOGREL BISULFATE 75 MG/1
75 TABLET, FILM COATED ORAL DAILY
Refills: 0 | Status: DISCONTINUED | OUTPATIENT
Start: 2023-08-15 | End: 2023-08-15

## 2023-08-15 RX ORDER — POTASSIUM CHLORIDE 20 MEQ
40 PACKET (EA) ORAL ONCE
Refills: 0 | Status: COMPLETED | OUTPATIENT
Start: 2023-08-15 | End: 2023-08-15

## 2023-08-15 RX ORDER — METOPROLOL TARTRATE 50 MG
25 TABLET ORAL DAILY
Refills: 0 | Status: DISCONTINUED | OUTPATIENT
Start: 2023-08-15 | End: 2023-08-15

## 2023-08-15 RX ORDER — ATORVASTATIN CALCIUM 80 MG/1
40 TABLET, FILM COATED ORAL AT BEDTIME
Refills: 0 | Status: DISCONTINUED | OUTPATIENT
Start: 2023-08-15 | End: 2023-08-15

## 2023-08-15 RX ORDER — REGADENOSON 0.08 MG/ML
0.4 INJECTION, SOLUTION INTRAVENOUS ONCE
Refills: 0 | Status: COMPLETED | OUTPATIENT
Start: 2023-08-15 | End: 2023-08-15

## 2023-08-15 RX ORDER — METOPROLOL TARTRATE 50 MG
12.5 TABLET ORAL
Refills: 0 | Status: DISCONTINUED | OUTPATIENT
Start: 2023-08-15 | End: 2023-08-15

## 2023-08-15 RX ADMIN — REGADENOSON 0.4 MILLIGRAM(S): 0.08 INJECTION, SOLUTION INTRAVENOUS at 11:45

## 2023-08-15 RX ADMIN — CLOPIDOGREL BISULFATE 75 MILLIGRAM(S): 75 TABLET, FILM COATED ORAL at 16:15

## 2023-08-15 RX ADMIN — Medication 81 MILLIGRAM(S): at 14:45

## 2023-08-15 RX ADMIN — Medication 1 DROP(S): at 06:40

## 2023-08-15 RX ADMIN — Medication 650 MILLIGRAM(S): at 02:15

## 2023-08-15 RX ADMIN — Medication 40 MILLIEQUIVALENT(S): at 09:28

## 2023-08-15 RX ADMIN — Medication 2: at 08:27

## 2023-08-15 RX ADMIN — Medication 650 MILLIGRAM(S): at 03:15

## 2023-08-15 RX ADMIN — Medication 1 DROP(S): at 14:45

## 2023-08-15 RX ADMIN — Medication 25 MILLIGRAM(S): at 16:15

## 2023-08-15 NOTE — CHART NOTE - NSCHARTNOTEFT_GEN_A_CORE
..... Pentacel and Hepatitis A vaccines are given today.    ..... Continue with slow advance on whole milk.   Avoid cheese for another few months or completely as this is much more concentrated form of dairy protein.     Your Child's Health  15-Month-Old Visit    Armani ADITIJodie Jennifer  August 15, 2016    Visit Vitals  Temp 97.2 °F (36.2 °C) (Temporal Artery)   Ht 33.5\" (85.1 cm)   Wt 10.3 kg   HC 46.5 cm (18.31\")   BMI 14.29 kg/m²     Weight: 22.81 lbs    NUTRITION:    Growth slows down after 1 year of age.  Armani's appetite may decrease and become more sporadic.  Serve Armani food in small portions. Let him be the  of how much to eat. You should be the one to control what food is available and when.  Set a good example with your own eating habits right from the start.        Encourage self-feeding of table food. Using finger-thumb grasp and the spoon will help improve Armani's motor skills and teaches independence. Picky and sporadic feeding is normal. Most children grow despite picky eating. Playing games trying to force feed will only worsen the behavior. Sometimes, children can be picky eaters because they have had too much juice or milk, especially before meals.        Excessive fruit juice may cause gas, crabbiness and bowel movements that vary from diarrhea to constipation. Realize that 4 oz of fruit juice for a 25 lb child is the equivalent of 24 oz of fruit juice for a 150 lb adult.  Very few of us would think that drinking 24 oz of fruit juice at one time is healthy. It takes 3-4 pieces of fruit to make 8 oz of fruit juice, leaving all the healthy fiber behind in the process.  Whole fruit is preferable.        A multivitamin is recommended for all children to address adequate Vitamin D and Iron intake.  A multivitamin with 400 units of vitamin D should be given to all children.    Being overweight is a serious problem in the [place]. You can help your child avoid this problem by following  these guidelines:   · Limit TV and Video total time to 2 hours per day or less.    ·  Don't encourage your children to eat if they tell you they are full.  Healthy           children will not starve themselves.     ·  Don't reward your children for cleaning their plates.  If they always leave food,     cut down the size of the portion and have them ask for more if hungry.    ·  Don't allow children to dish out their own portions. They will imitate adults, or    imitate what they have seen on TV; in both cases, the amount will be too           large. This results in increased calorie intake and waste.  At least for children   above 5 years of age and for adults, people eat more when given larger portions.      DEVELOPMENT/BEHAVIOR:    Most children will point to body parts at this age.    Most children will understand language more than they will be able to vocalize. Most know 4-6 words, even if the parents are the only ones who can understand those words.    Armani will begin to assert his independence right about the time that he walks independently.        Discipline  · Parents should discuss and agree on the approach to discipline and which behaviors are to be forbidden.  Remember that things that are \"cute\" (like hitting) when Armani is young will not be so cute when he is a teenager.   · Long lectures after Armani misbehaves will not work; rather they actually reinforce the misbehavior by giving it attention. After all, attention is the thing that Armani most desires from you.  Help yourself by being brief, by walling off hazards and breakables, and by distracting Armani from tantrums.  Nobody has the energy to teach all the rules at once.  Work on a few things at a time.  · Children are smart enough to learn that daddy and mommy may tolerate different things but for dangerous behavior it is important for all caretakers to be consistent.  Armani will learn faster if the messages about dangerous  went to discuss the results of nuc stress test with pt and son at bedside  pt and family aware of large reversible defects, likely the etiology of her presenting symptoms  suspect 2/2 poor med compliance  asa, plavix, lipitor and toprol started  tte pending  pt recommended to undergo cardiac cath  discussed with son in person at bedside  separately again discussed with pt via  phone  pt aware of clinical picture, nuc stress findings and able to state back information   pt refusing  pt and son both aware that this is a preferred option (vs standard medication therapy) and that there is increased morbidity and mortality with CAD, especially neovascularized CAD.   pt not interested in any other cardiac procedures at this time  cont med mgmt with asa plavix lipitor and toprol  will consider additional medications pending results of the echo  cont tele  will follow activities are always the same.  · Tantrums will appear soon.  Ask for a patient information sheet if you are having problems.      SAFETY/ACCIDENT PREVENTION:  · Car Safety: A rear facing car seat in the back seat is the safest place in the car, especially in a car with passenger side airbags.  Forward facing seats may be used when Armani is over 20 lbs. and over one year.  · Poisoning: Use safety caps on medicines.  Household  and polishes must be kept out of reach. Store medicines and  out of sight.  Don't transfer medicines to non-childproofed or unlabeled containers. Dispose of unused medications. Be especially careful when visiting older persons or families without children in the house.  They may be in the habit of keeping their medicines out on tables.  Syrup of Ipecac is no longer recommended to be kept in the home. Please dispose of any you might have. Call the clinic at [phone] or the Poison Control Center at [phone] (long distance [phone]) for any known or suspected poisoning.  · Falls: Remove furniture with sharp edges.  Coffee tables are especially hazardous.  Use jeter on stairs and window latches on second story windows.  · Choking: Avoid peanuts, gum, hard candy, raisins, or popcorn until Armani is five years old.  Armani can choke on foods that he can't chew well.  Don't let Armani eat while running or playing.  Be sure foods such as grapes, peas, and corn are prepared in a size and consistency that he or she can handle.  · Burns: Begin to teach hot and cold.  Have a family fire safety plan including regular smoke detector battery checks, working fire extinguishers, and two planned escape routes.  · Visiting: Be alert for safety hazards when visiting other households (especially in homes not used to having children around).    GENERAL ADVICE:  Smoking:  Children exposed to cigarette smoke have more ear infections and pneumonia.  Cold symptoms may last longer.   If you smoke, please quit.  If you cannot quit, smoke outside of your home.  Please don't let others smoke in your home.    Sleep Practices: The basic principles of good sleep are similar to those for adults:  · Provide a consistent bedtime.  · Provide a consistent time for the evening meal, preferably not immediately before bedtime.  · Provide a reassuring routine (stories, prayers, songs, etc.).  · Provide a transition object.  This might be a pillow, favorite blanket, or stuffed animal, but avoid providing transition objects for the mouth.  Don't allow objects on strings or that could be swallowed.  · Provide a consistent waking time: this is important unless Armani is ill and needs extra sleep.  Allowing children who have slept poorly to \"sleep in\" delays meals and naps and gets them further off schedule.  You would do better to allow a longer nap.  · Avoid stimulating activity before bedtime -- for example: scary or intense movies especially where children in the movie are frightened or hurt, similar television programs, and high-level physical activity.    Shoes:  Your baby's feet will develop fine whether he has shoes or not. Children can run barefoot indoors and on safe outdoor surfaces.  Shoes are needed once Armani begins walking for warmth, protection and, of course, for cuteness.  Choose shoes that have the following: roomy fit, flat and flexible soles, and non-skid bottoms.           MEDICATION FOR FEVER OR PAIN:     Ibuprofen (Motrin/Advil) Infant or Children's Suspension (100 mg/5 mL).  Give 1  tsp or 5 mL by mouth every 6-8 hours as needed for fever/pain or   Acetaminophen (Tylenol) Infant or Children's suspension (160 mg/5 mL).   Give 1  tsp or 5 mL by mouth every 4 hours as needed for fever/pain.  These are maximum doses.  Half these doses may be used for mild fever or pain.  Do not exceed the maximum doses.     Most Recent Immunizations   Administered Date(s) Administered   • DTaP/HIB/IPV  2017   • Hep B, adolescent or pediatric 02/12/2018   • Influenza, injectable, quadrivalent, preservative free, pediatric 02/12/2018   • MMR 05/21/2018   • Pneumococcal Conjugate 13 valent 05/21/2018   • Rotavirus - pentavalent 2017   • Varicella 05/21/2018   Pended Date(s) Pended   • DTaP/HIB/IPV 08/15/2018   • Hep A, ped/adol, 2 dose 08/15/2018       If Armani develops any of the following reactions within 72 hours after an immunization, notify your pediatrician by calling the pediatric phone nurse:   ? A temperature of 105 degrees or above   ? More than 3 hours of continuous crying   ? A shrill, high-pitched cry   ? A pale, limp spell   ? A seizure or fainting spell.  In this case, you should call 911 or go immediately                   to the emergency room.      NEXT VISIT: 18 MONTHS OF AGE    Thank you for entrusting your care to [facility].

## 2023-08-15 NOTE — CONSULT NOTE ADULT - ASSESSMENT
77[place] s/[place] PCI (2018 in [place]), s/p CABG 2022 in [place]) now p/w CP    comfortable appearing and euvolemic  trop negative  ekg with twi, no prev for comparison  no active cp or sob  lower suspicion for acs here  would not treat for acs  family trying to obtain medication lists  tte pending   nuc stress pending  will follow up those tests  given hx would at least start asa, lipitor, bb unless contraindicated (pt denies allergies to meds)  cont tele  will follow with you

## 2023-08-15 NOTE — CHART NOTE - NSCHARTNOTEFT_GEN_A_CORE
Called by RN for pt with son at bedside stating pt is leaving AMA Called by RN for pt with son at bedside stating pt is leaving AMA.  Son refused translation services for pt; pt is seen sitting on edge of bed collecting/sorting through her personal belongings and clothing.  pt and son alert and oriented x3, not intoxicated, and w/ capability to make medical decisions.  Son is very agitated and reports that "his mother felt threatened" after speaking with the cardiologist today.  He is very upset, yelling in the pt room and states he is taking his mother for care elsewhere.  I apologized that we failed to meet his and his mothers expectations, and explained that while they were absolutely entitled to leave AMA that I wanted to be clear both he and the pt understood the abnl findings on stress test and ekg and the recommendation for timely/urgent follow up care including echo and cardiac cath.  Son continued to yell over me but I was able to communicate these risks including but not limited to worsening of symptoms, progression of disease state, heart attack and death.  Son stated he understood these risks and "I'm taking her to another hospital for a second opinion"  Man Hubbard signed ama form which was placed in paper chart, iv was removed by RN.

## 2023-08-15 NOTE — PROGRESS NOTE ADULT - SUBJECTIVE AND OBJECTIVE BOX
PROGRESS NOTE:   Authored by Dr. Sophia Simpson MD, Available on MS Teams    Patient is a 77y old  Female who presents with a chief complaint of Chest Pain r/o ACS (14 Aug 2023 19:05)      SUBJECTIVE / OVERNIGHT EVENTS: Translated by son at bedside. Patient feels better, chest pain is improved. No shortness of breath.     ADDITIONAL REVIEW OF SYSTEMS:    MEDICATIONS  (STANDING):  aspirin enteric coated 81 milliGRAM(s) Oral daily  dextrose 5%. 1000 milliLiter(s) (100 mL/Hr) IV Continuous <Continuous>  dextrose 5%. 1000 milliLiter(s) (50 mL/Hr) IV Continuous <Continuous>  dextrose 50% Injectable 25 Gram(s) IV Push once  dextrose 50% Injectable 12.5 Gram(s) IV Push once  dextrose 50% Injectable 25 Gram(s) IV Push once  glucagon  Injectable 1 milliGRAM(s) IntraMuscular once  insulin lispro (ADMELOG) corrective regimen sliding scale   SubCutaneous three times a day before meals  insulin lispro (ADMELOG) corrective regimen sliding scale   SubCutaneous at bedtime  ofloxacin 0.3% Solution 1 Drop(s) Both EYES three times a day    MEDICATIONS  (PRN):  acetaminophen     Tablet .. 650 milliGRAM(s) Oral every 6 hours PRN Temp greater or equal to 38C (100.4F), Mild Pain (1 - 3)  dextrose Oral Gel 15 Gram(s) Oral once PRN Blood Glucose LESS THAN 70 milliGRAM(s)/deciliter  melatonin 3 milliGRAM(s) Oral at bedtime PRN Insomnia  traMADol 50 milliGRAM(s) Oral every 6 hours PRN Moderate Pain (4 - 6)      CAPILLARY BLOOD GLUCOSE      POCT Blood Glucose.: 225 mg/dL (15 Aug 2023 07:52)  POCT Blood Glucose.: 117 mg/dL (14 Aug 2023 21:50)    I&O's Summary      PHYSICAL EXAM:  Vital Signs Last 24 Hrs  T(C): 36.9 (15 Aug 2023 05:22), Max: 37 (14 Aug 2023 20:49)  T(F): 98.5 (15 Aug 2023 05:22), Max: 98.6 (14 Aug 2023 20:49)  HR: 60 (15 Aug 2023 05:22) (60 - 76)  BP: 117/75 (15 Aug 2023 05:22) (117/72 - 157/60)  BP(mean): --  RR: 16 (15 Aug 2023 05:22) (15 - 18)  SpO2: 97% (15 Aug 2023 05:22) (96% - 99%)    Parameters below as of 15 Aug 2023 05:22  Patient On (Oxygen Delivery Method): room air        CONSTITUTIONAL: NAD, well-developed  RESPIRATORY: Normal respiratory effort; lungs are clear to auscultation bilaterally  CARDIOVASCULAR: Regular rate and rhythm, normal S1 and S2, no murmur/rub/gallop; No lower extremity edema  ABDOMEN: Nontender to palpation, normoactive bowel sounds, no rebound/guarding  MUSCLOSKELETAL: no clubbing or cyanosis of digits; no joint swelling or tenderness to palpation, +chest wall tenderness  PSYCH: A+O to person, place, and time; affect appropriate    LABS:                        12.4   7.05  )-----------( 246      ( 15 Aug 2023 06:10 )             35.5     08-15    134<L>  |  101  |  11  ----------------------------<  141<H>  3.5   |  27  |  0.58    Ca    9.0      15 Aug 2023 06:10  Phos  3.6     08-15  Mg     2.1     08-15    TPro  7.2  /  Alb  3.6  /  TBili  0.3  /  DBili  x   /  AST  14<L>  /  ALT  19  /  AlkPhos  70  08-14      CARDIAC MARKERS ( 14 Aug 2023 19:05 )  x     / x     / 29 U/L / x     / <1.0 ng/mL      Urinalysis Basic - ( 15 Aug 2023 06:10 )    Color: x / Appearance: x / SG: x / pH: x  Gluc: 141 mg/dL / Ketone: x  / Bili: x / Urobili: x   Blood: x / Protein: x / Nitrite: x   Leuk Esterase: x / RBC: x / WBC x   Sq Epi: x / Non Sq Epi: x / Bacteria: x

## 2023-08-15 NOTE — CONSULT NOTE ADULT - SUBJECTIVE AND OBJECTIVE BOX
MANDEEP BETSY  [id number]    Date of Consult:  8/15/23  CHIEF COMPLAINT:  cp  HISTORY OF PRESENT ILLNESS:  77[place] s/p PCI (2018 in [place]), s/p CABG 2022 in [place]) now p/w CP pt states ongoing intermittent symptoms over past few days with an acute worsening yesterday so she came in. pt states cp is different from prev episodes when she required PCI and her MI last year prior to undergoing a CABG. pt also endorsing assoc sob. occasional palpitations without dizziness or syncope. pr denies edema, or orthopnea. pt has been in [place] for a few months out of this past year, at some point ran out of all her medications and doesnt remember what she is on or which pharmacy she uses or which meds were given to her by which providers. currently pt off all meds. pt endorses L axilla pain and chest wall is tender to palpation. pt laying flat in no acute distress, not on oxygen therapy.     trop negative. ekg with twi inferiorly and laterally   Allergies    No Known Allergies    Intolerances    	    MEDICATIONS:  aspirin enteric coated 81 milliGRAM(s) Oral daily        acetaminophen     Tablet .. 650 milliGRAM(s) Oral every 6 hours PRN  melatonin 3 milliGRAM(s) Oral at bedtime PRN  traMADol 50 milliGRAM(s) Oral every 6 hours PRN      dextrose 50% Injectable 25 Gram(s) IV Push once  dextrose 50% Injectable 12.5 Gram(s) IV Push once  dextrose 50% Injectable 25 Gram(s) IV Push once  dextrose Oral Gel 15 Gram(s) Oral once PRN  glucagon  Injectable 1 milliGRAM(s) IntraMuscular once  insulin lispro (ADMELOG) corrective regimen sliding scale   SubCutaneous three times a day before meals  insulin lispro (ADMELOG) corrective regimen sliding scale   SubCutaneous at bedtime    dextrose 5%. 1000 milliLiter(s) IV Continuous <Continuous>  dextrose 5%. 1000 milliLiter(s) IV Continuous <Continuous>  ofloxacin 0.3% Solution 1 Drop(s) Both EYES three times a day      PAST MEDICAL & SURGICAL HISTORY:  CAD (coronary artery disease)      Presence of stent of CABG      HTN (hypertension)      HLD (hyperlipidemia)      Type 2 diabetes mellitus      S/P CABG (coronary artery bypass graft)      H/O fracture of femur          FAMILY HISTORY:  No pertinent family history in first degree relatives        SOCIAL HISTORY:    no tob etoh or drugs      REVIEW OF SYSTEMS:  See HPI. Otherwise, 10 point ROS done and otherwise negative.    PHYSICAL EXAM:  T(C): 36.9 (08-15-23 @ 05:22), Max: 37 (08-14-23 @ 20:49)  HR: 60 (08-15-23 @ 05:22) (60 - 76)  BP: 117/75 (08-15-23 @ 05:22) (117/72 - 157/60)  RR: 16 (08-15-23 @ 05:22) (15 - 18)  SpO2: 97% (08-15-23 @ 05:22) (96% - 99%)  Wt(kg): --  I&O's Summary      Appearance: Normal	  HEENT:   Normal oral mucosa, PERRL, EOMI	  Lymphatic: No lymphadenopathy  Cardiovascular: Normal S1 S2, No JVD, No murmurs, No edema  Respiratory: Lungs clear to auscultation	  Psychiatry: A & O x 3, Mood & affect appropriate  Gastrointestinal:  Soft, Non-tender, + BS	  Skin: No rashes, No ecchymoses, No cyanosis	  Neurologic: Non-focal  Extremities: Normal range of motion, No clubbing, cyanosis      LABS:	 	    CBC Full  -  ( 15 Aug 2023 06:10 )  WBC Count : 7.05 K/uL  Hemoglobin : 12.4 g/dL  Hematocrit : 35.5 %  Platelet Count - Automated : 246 K/uL  Mean Cell Volume : 97.5 fl  Mean Cell Hemoglobin : 34.1 pg  Mean Cell Hemoglobin Concentration : 34.9 g/dL  Auto Neutrophil # : x  Auto Lymphocyte # : x  Auto Monocyte # : x  Auto Eosinophil # : x  Auto Basophil # : x  Auto Neutrophil % : x  Auto Lymphocyte % : x  Auto Monocyte % : x  Auto Eosinophil % : x  Auto Basophil % : x    08-15    134<L>  |  101  |  11  ----------------------------<  141<H>  3.5   |  27  |  0.58  08-14    135  |  99  |  10  ----------------------------<  107<H>  3.9   |  27  |  0.68    Ca    9.0      15 Aug 2023 06:10  Ca    9.8      14 Aug 2023 16:15  Phos  3.6     08-15  Mg     2.1     08-15    TPro  7.2  /  Alb  3.6  /  TBili  0.3  /  DBili  x   /  AST  14<L>  /  ALT  19  /  AlkPhos  70  08-14      proBNP:   Lipid Profile:   HgA1c:   TSH:       CARDIAC MARKERS:            TELEMETRY: 	    ECG:  	  RADIOLOGY:  OTHER: 	    PREVIOUS DIAGNOSTIC TESTING:    [ ] Echocardiogram:  [ ]  Catheterization:  [ ] Stress Test:  	  	  ASSESSMENT/PLAN: 	    Girma Briones MD

## 2023-08-15 NOTE — PROGRESS NOTE ADULT - ASSESSMENT
78 y/o F w/ PMHx of HTN, HLD, DM type 2, CAD s/p PCI and s/p CABG in August last yr in [place], Depression presents to the ED c/o chest pain.    # Chest pain  - tele monitoring  - ASA, statin  - pt high risk and hx highly suspicious, also EKG changes new compared to last one in chart  - s/p stress today, results pending  - cardiology consulted, pending recs  - son to bring in home med list in am    # DM type 2  - FS qAC and HS w/ SSI  - A1c 7.4    # HTN/HLD  - son to bring in med list in am    # DVT ppx - low risk, short-term stay    D/W son at bedside

## 2023-08-16 PROBLEM — E11.9 TYPE 2 DIABETES MELLITUS WITHOUT COMPLICATIONS: Chronic | Status: ACTIVE | Noted: 2023-08-14

## 2023-08-16 PROBLEM — Z95.5 PRESENCE OF CORONARY ANGIOPLASTY IMPLANT AND GRAFT: Chronic | Status: ACTIVE | Noted: 2023-08-14

## 2023-08-16 PROBLEM — I10 ESSENTIAL (PRIMARY) HYPERTENSION: Chronic | Status: ACTIVE | Noted: 2023-08-14

## 2023-08-16 PROBLEM — E78.5 HYPERLIPIDEMIA, UNSPECIFIED: Chronic | Status: ACTIVE | Noted: 2023-08-14

## 2023-08-16 PROBLEM — I25.10 ATHEROSCLEROTIC HEART DISEASE OF NATIVE CORONARY ARTERY WITHOUT ANGINA PECTORIS: Chronic | Status: ACTIVE | Noted: 2023-08-14

## 2023-08-16 LAB
25(OH)D3 SERPL-MCNC: 30.4 NG/ML
ALBUMIN SERPL ELPH-MCNC: 4.6 G/DL
ALP BLD-CCNC: 75 U/L
ALT SERPL-CCNC: 19 U/L
ANION GAP SERPL CALC-SCNC: 16 MMOL/L
APPEARANCE: CLEAR
AST SERPL-CCNC: 22 U/L
B BURGDOR AB SER-IMP: NEGATIVE
B BURGDOR IGG+IGM SER QL: 0.12 INDEX
BACTERIA: NEGATIVE
BASOPHILS # BLD AUTO: 0.02 K/UL
BASOPHILS NFR BLD AUTO: 0.3 %
BILIRUB SERPL-MCNC: 0.4 MG/DL
BILIRUBIN URINE: NEGATIVE
BLOOD URINE: NEGATIVE
BUN SERPL-MCNC: 11 MG/DL
CALCIUM SERPL-MCNC: 10 MG/DL
CHLORIDE SERPL-SCNC: 99 MMOL/L
CHOLEST SERPL-MCNC: 155 MG/DL
CO2 SERPL-SCNC: 21 MMOL/L
COLOR: YELLOW
CREAT SERPL-MCNC: 0.69 MG/DL
CREAT SPEC-SCNC: 159 MG/DL
EGFR: 91 ML/MIN/1.73M2
EOSINOPHIL # BLD AUTO: 0.15 K/UL
EOSINOPHIL NFR BLD AUTO: 2.4 %
ESTIMATED AVERAGE GLUCOSE: 324 MG/DL
FOLATE SERPL-MCNC: 17 NG/ML
GLUCOSE QUALITATIVE U: ABNORMAL
GLUCOSE SERPL-MCNC: 306 MG/DL
HBA1C MFR BLD HPLC: 12.9 %
HCT VFR BLD CALC: 39.2 %
HDLC SERPL-MCNC: 40 MG/DL
HGB BLD-MCNC: 12.9 G/DL
HOMOCYSTEINE LEVEL: 6.4 UMOL/L
HYALINE CASTS: 7 /LPF
IMM GRANULOCYTES NFR BLD AUTO: 0.2 %
KETONES URINE: NORMAL
LDLC SERPL CALC-MCNC: 55 MG/DL
LEUKOCYTE ESTERASE URINE: ABNORMAL
LYMPHOCYTES # BLD AUTO: 2.36 K/UL
LYMPHOCYTES NFR BLD AUTO: 37 %
MAN DIFF?: NORMAL
MCHC RBC-ENTMCNC: 32.9 GM/DL
MCHC RBC-ENTMCNC: 33 PG
MCV RBC AUTO: 100.3 FL
METHYLMALONIC ACID LEVEL: 86 NMOL/L
MICROALBUMIN 24H UR DL<=1MG/L-MCNC: 2.8 MG/DL
MICROALBUMIN/CREAT 24H UR-RTO: 17 MG/G
MICROSCOPIC-UA: NORMAL
MONOCYTES # BLD AUTO: 0.37 K/UL
MONOCYTES NFR BLD AUTO: 5.8 %
NEUTROPHILS # BLD AUTO: 3.47 K/UL
NEUTROPHILS NFR BLD AUTO: 54.3 %
NITRITE URINE: NEGATIVE
NONHDLC SERPL-MCNC: 115 MG/DL
PH URINE: 6
PLATELET # BLD AUTO: 222 K/UL
POTASSIUM SERPL-SCNC: 4.2 MMOL/L
PROT SERPL-MCNC: 7 G/DL
PROTEIN URINE: NORMAL
RBC # BLD: 3.91 M/UL
RBC # FLD: 12.6 %
RED BLOOD CELLS URINE: 1 /HPF
SODIUM SERPL-SCNC: 136 MMOL/L
SPECIFIC GRAVITY URINE: 1.03
SQUAMOUS EPITHELIAL CELLS: 2 /HPF
TRIGL SERPL-MCNC: 301 MG/DL
TSH SERPL-ACNC: 3.93 UIU/ML
UROBILINOGEN URINE: NORMAL
VIT B1 SERPL-MCNC: 129.3 NMOL/L
VIT B12 SERPL-MCNC: >2000 PG/ML
WBC # FLD AUTO: 6.38 K/UL
WHITE BLOOD CELLS URINE: 10 /HPF

## 2023-09-28 ENCOUNTER — APPOINTMENT (OUTPATIENT)
Dept: ORTHOPEDIC SURGERY | Facility: CLINIC | Age: 78
End: 2023-09-28
Payer: MEDICARE

## 2023-09-28 VITALS — WEIGHT: 141 LBS | HEIGHT: 64 IN | BODY MASS INDEX: 24.07 KG/M2

## 2023-09-28 DIAGNOSIS — E11.9 TYPE 2 DIABETES MELLITUS W/OUT COMPLICATIONS: ICD-10-CM

## 2023-09-28 PROCEDURE — 99203 OFFICE O/P NEW LOW 30 MIN: CPT | Mod: 25

## 2023-09-28 PROCEDURE — 20610 DRAIN/INJ JOINT/BURSA W/O US: CPT | Mod: LT

## 2023-09-28 PROCEDURE — 73562 X-RAY EXAM OF KNEE 3: CPT | Mod: LT

## 2023-09-28 RX ORDER — ACETAMINOPHEN 500 MG/1
500 TABLET ORAL
Qty: 60 | Refills: 2 | Status: ACTIVE | COMMUNITY
Start: 2023-09-28 | End: 1900-01-01

## 2023-10-04 ENCOUNTER — APPOINTMENT (OUTPATIENT)
Dept: CARDIOLOGY | Facility: CLINIC | Age: 78
End: 2023-10-04

## 2023-10-06 ENCOUNTER — APPOINTMENT (OUTPATIENT)
Dept: ORTHOPEDIC SURGERY | Facility: CLINIC | Age: 78
End: 2023-10-06
Payer: MEDICARE

## 2023-10-06 VITALS — BODY MASS INDEX: 24.07 KG/M2 | HEIGHT: 64 IN | WEIGHT: 141 LBS

## 2023-10-06 PROCEDURE — 99024 POSTOP FOLLOW-UP VISIT: CPT

## 2023-10-06 PROCEDURE — 20610 DRAIN/INJ JOINT/BURSA W/O US: CPT

## 2023-10-11 ENCOUNTER — OFFICE (OUTPATIENT)
Facility: LOCATION | Age: 78
Setting detail: OPHTHALMOLOGY
End: 2023-10-11
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
## 2023-10-11 PROCEDURE — NO SHOW FE NO SHOW FEE: Performed by: OPHTHALMOLOGY

## 2023-10-16 ENCOUNTER — OFFICE (OUTPATIENT)
Facility: LOCATION | Age: 78
Setting detail: OPHTHALMOLOGY
End: 2023-10-16
Payer: COMMERCIAL

## 2023-10-16 ENCOUNTER — RX ONLY (RX ONLY)
Age: 78
End: 2023-10-16

## 2023-10-16 ENCOUNTER — APPOINTMENT (OUTPATIENT)
Dept: ORTHOPEDIC SURGERY | Facility: CLINIC | Age: 78
End: 2023-10-16
Payer: MEDICARE

## 2023-10-16 VITALS — HEIGHT: 64 IN | BODY MASS INDEX: 24.07 KG/M2 | WEIGHT: 141 LBS

## 2023-10-16 DIAGNOSIS — H02.834: ICD-10-CM

## 2023-10-16 DIAGNOSIS — E11.3293: ICD-10-CM

## 2023-10-16 DIAGNOSIS — M12.562 TRAUMATIC ARTHROPATHY, LEFT KNEE: ICD-10-CM

## 2023-10-16 DIAGNOSIS — H02.831: ICD-10-CM

## 2023-10-16 DIAGNOSIS — H26.493: ICD-10-CM

## 2023-10-16 DIAGNOSIS — H52.4: ICD-10-CM

## 2023-10-16 PROCEDURE — 20610 DRAIN/INJ JOINT/BURSA W/O US: CPT | Mod: LT

## 2023-10-16 PROCEDURE — 92134 CPTRZ OPH DX IMG PST SGM RTA: CPT | Performed by: OPHTHALMOLOGY

## 2023-10-16 PROCEDURE — 92014 COMPRE OPH EXAM EST PT 1/>: CPT | Performed by: OPHTHALMOLOGY

## 2023-10-16 PROCEDURE — 92015 DETERMINE REFRACTIVE STATE: CPT | Performed by: OPHTHALMOLOGY

## 2023-10-16 PROCEDURE — 99024 POSTOP FOLLOW-UP VISIT: CPT

## 2023-10-16 ASSESSMENT — REFRACTION_CURRENTRX
OD_CYLINDER: +0.75
OS_CYLINDER: +1.00
OD_ADD: +3.00
OS_OVR_VA: 20/
OS_AXIS: 095
OS_SPHERE: -0.25
OS_ADD: +3.00
OD_SPHERE: -0.25
OD_AXIS: 145
OD_OVR_VA: 20/

## 2023-10-16 ASSESSMENT — AXIALLENGTH_DERIVED
OD_AL: 23.1389
OS_AL: 23.1804
OS_AL: 23.0868
OD_AL: 23.0921

## 2023-10-16 ASSESSMENT — SPHEQUIV_DERIVED
OS_SPHEQUIV: 0.25
OS_SPHEQUIV: 0.5
OD_SPHEQUIV: 0.25
OD_SPHEQUIV: 0.125

## 2023-10-16 ASSESSMENT — CONFRONTATIONAL VISUAL FIELD TEST (CVF)
OS_FINDINGS: FULL
OD_FINDINGS: FULL

## 2023-10-16 ASSESSMENT — REFRACTION_AUTOREFRACTION
OD_SPHERE: +0.50
OD_CYLINDER: -0.50
OS_SPHERE: +0.75
OD_AXIS: 153
OS_CYLINDER: -0.50
OS_AXIS: 003

## 2023-10-16 ASSESSMENT — KERATOMETRY
OD_AXISANGLE_DEGREES: 079
OS_K1POWER_DIOPTERS: 44.00
OS_AXISANGLE_DEGREES: 091
OS_K2POWER_DIOPTERS: 44.75
OD_K1POWER_DIOPTERS: 44.25
OD_K2POWER_DIOPTERS: 45.00

## 2023-10-16 ASSESSMENT — VISUAL ACUITY
OS_BCVA: 20/30+
OD_BCVA: 20/25-

## 2023-10-16 ASSESSMENT — TONOMETRY
OD_IOP_MMHG: 14
OS_IOP_MMHG: 15

## 2023-10-16 ASSESSMENT — REFRACTION_MANIFEST
OS_SPHERE: -0.25
OD_ADD: +3.00
OS_ADD: +3.00
OD_SPHERE: -0.25
OD_AXIS: 145
OD_CYLINDER: +0.75
OS_CYLINDER: +1.00
OS_AXIS: 095

## 2023-10-16 ASSESSMENT — LID POSITION - DERMATOCHALASIS
OD_DERMATOCHALASIS: RUL 3+
OS_DERMATOCHALASIS: LUL 3+

## 2023-10-16 ASSESSMENT — LID POSITION - COMMENTS
OD_COMMENTS: WITH HOODING
OS_COMMENTS: WITH HOODING